# Patient Record
Sex: FEMALE | Race: BLACK OR AFRICAN AMERICAN | NOT HISPANIC OR LATINO | Employment: PART TIME | URBAN - NONMETROPOLITAN AREA
[De-identification: names, ages, dates, MRNs, and addresses within clinical notes are randomized per-mention and may not be internally consistent; named-entity substitution may affect disease eponyms.]

---

## 2017-03-07 RX ORDER — LISINOPRIL 5 MG/1
TABLET ORAL
Qty: 30 TABLET | Refills: 2 | Status: SHIPPED | OUTPATIENT
Start: 2017-03-07 | End: 2017-07-07 | Stop reason: SDUPTHER

## 2017-03-07 RX ORDER — ASPIRIN 81 MG
TABLET, DELAYED RELEASE (ENTERIC COATED) ORAL
Qty: 30 TABLET | Refills: 2 | Status: SHIPPED | OUTPATIENT
Start: 2017-03-07 | End: 2018-01-29

## 2017-04-04 ENCOUNTER — OFFICE VISIT (OUTPATIENT)
Dept: CARDIOLOGY | Facility: CLINIC | Age: 47
End: 2017-04-04

## 2017-04-04 VITALS
HEIGHT: 64 IN | BODY MASS INDEX: 31.07 KG/M2 | DIASTOLIC BLOOD PRESSURE: 70 MMHG | WEIGHT: 182 LBS | HEART RATE: 70 BPM | SYSTOLIC BLOOD PRESSURE: 120 MMHG

## 2017-04-04 DIAGNOSIS — I25.10 ATHEROSCLEROSIS OF NATIVE CORONARY ARTERY OF NATIVE HEART WITHOUT ANGINA PECTORIS: Primary | ICD-10-CM

## 2017-04-04 DIAGNOSIS — I10 ESSENTIAL HYPERTENSION: ICD-10-CM

## 2017-04-04 DIAGNOSIS — R06.00 DYSPNEA, UNSPECIFIED TYPE: ICD-10-CM

## 2017-04-04 DIAGNOSIS — I36.9 TRICUSPID VALVE DISORDERS, SPECIFIED AS NONRHEUMATIC: ICD-10-CM

## 2017-04-04 PROCEDURE — 99213 OFFICE O/P EST LOW 20 MIN: CPT | Performed by: INTERNAL MEDICINE

## 2017-04-04 RX ORDER — OMEPRAZOLE 40 MG/1
40 CAPSULE, DELAYED RELEASE ORAL
COMMUNITY
Start: 2017-03-07 | End: 2018-03-08

## 2017-04-04 NOTE — PROGRESS NOTES
Dominik Amaya  46 y.o. female    04/04/2017  1. Atherosclerosis of native coronary artery of native heart without angina pectoris    2. 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 Dyspnea, unspecified type    4. Essential hypertension        History of Present Illness: Routine follow-up    46 years old patient with history of hypertension, hyperlipidemia, CAD status post PTA stent of diagonal branch with a normal left and a systolic function and mild tricuspid regurgitation.  Patient presented routine follow-up.  She denies any orthopnea, PND, nauseous, vomiting, diarrhea.  She denies any intermittent claudication.  Present dysuria or hematuria.  No fever cough or chills reported.            SUBJECTIVE    Allergies   Allergen Reactions   • Acetaminophen    • Hydrocodone Bitartrate Er    • Oxycodone          Past Medical History:   Diagnosis Date   • Coronary atherosclerosis of native coronary artery    • Dyspnea    • Hyperlipidemia    • Hypertension    • Tricuspid valve disorders, specified as nonrheumatic          Past Surgical History:   Procedure Laterality Date   • ANKLE SURGERY     • CARDIAC CATHETERIZATION           No family history on file.      Social History     Social History   • Marital status:      Spouse name: N/A   • Number of children: N/A   • Years of education: N/A     Occupational History   • Not on file.     Social History Main Topics   • Smoking status: Former Smoker   • Smokeless tobacco: Not on file   • Alcohol use Not on file   • Drug use: Not on file   • Sexual activity: Not on file     Other Topics Concern   • Not on file     Social History Narrative         Current Outpatient Prescriptions   Medication Sig Dispense Refill   • omeprazole (priLOSEC) 40 MG capsule Take 40 mg by mouth.     • ASPIRIN LOW DOSE 81 MG EC tablet TAKE ONE TABLET BY MOUTH ONCE DAILY 30 tablet 2   • atorvastatin (LIPITOR) 20 MG tablet Take 1 tablet by mouth Daily.     • carvedilol  "(COREG) 3.125 MG tablet Take 1 tablet by mouth 2 (Two) Times a Day.     • clopidogrel (PLAVIX) 75 MG tablet TAKE ONE TABLET BY MOUTH EVERY DAY 90 tablet 2   • ferrous sulfate 324 (65 FE) MG tablet delayed-release EC tablet Take 324 mg by mouth Daily With Breakfast.     • lisinopril (PRINIVIL,ZESTRIL) 5 MG tablet TAKE ONE TABLET BY MOUTH ONCE DAILY 30 tablet 2     No current facility-administered medications for this visit.          OBJECTIVE    /70  Pulse 70  Ht 64\" (162.6 cm)  Wt 182 lb (82.6 kg)  BMI 31.24 kg/m2        Review of Systems     Constitutional:  Denies recent weight loss, weight gain, fever or chills, no change in exercise tolerance     HENT:  Denies any hearing loss, epistaxis, hoarseness, or difficulty speaking.     Eyes: No blurring  Respiratory:  Denies dyspnea with exertion,no cough, wheezing, or hemoptysis.     Cardiovascular: See H&P    Gastrointestinal:  Denies change in bowel habits, dyspepsia, ulcer disease, hematochezia, or melena.     Endocrine: Negative for cold intolerance, heat intolerance, polydipsia, polyphagia and polyuria. Denies any history of weight change, heat/cold intolerance, polydipsia, polyuria     Genitourinary: Negative.      Musculoskeletal: Denies any history of arthritic symptoms or back problems     Skin:  Denies any change in hair or nails, rashes, or skin lesions.     Allergic/Immunologic: Negative.  Negative for environmental allergies, food allergies and immunocompromised state.     Neurological:  Denies any history of recurrent headaches, strokes, TIA, or seizure disorder.     Hematological: Denies any food allergies, seasonal allergies, bleeding disorders, or lymphadenopathy.     Psychiatric/Behavioral: Denies any history of depression, substance abuse, or change in cognitive function.         Physical Exam     Constitutional: Cooperative, alert and oriented, well-developed, well-nourished, in no acute distress.     HENT:   Head: Normocephalic, normal " hair patterns, no masses or tenderness.  Ears, Nose, and Throat: No gross abnormalities. No pallor or cyanosis. Dentition good.   Eyes: EOMS intact, PERRL, conjunctivae and lids unremarkable. Fundoscopic exam and visual fields not performed.   Neck: No palpable masses or adenopathy, no thyromegaly, no JVD, carotid pulses are full and equal bilaterally and without  Bruits.     Cardiovascular: Regular rhythm, S1 and S2 normal, no S3 or S4. Apical impulse not displaced. No murmurs, gallops, or rubs detected.     Pulmonary/Chest: Chest: normal symmetry, no tenderness to palpation, normal respiratory excursion, no intercostal retraction, no use of accessory muscles.            Pulmonary: Normal breath sounds. No rales or ronchi.    Abdominal: Abdomen soft, bowel sounds normoactive, no masses, no hepatosplenomegaly, non-tender, no bruits.     Musculoskeletal: No deformities, clubbing, cyanosis, erythema, or edema observed. There are no spinal abnormalities noted. Normal muscle strength and tone. Pulses full and equal in all extremities, no bruits auscultated.     Neurological: No gross motor or sensory deficits noted, affect appropriate, oriented to time, person, place.     Skin: Warm and dry to the touch, no apparent skin lesions or masses noted.     Psychiatric: She has a normal mood and affect. Her behavior is normal. Judgment and thought content normal.         Procedures      Lab Results   Component Value Date    WBC 5.6 10/27/2014    HGB 9.2 (L) 10/27/2014    HCT 30.0 (L) 10/27/2014    MCV 75.6 (L) 10/27/2014     10/27/2014     Lab Results   Component Value Date    GLUCOSE 92 10/27/2014    BUN 12 10/27/2014    CREATININE 0.7 10/27/2014    CO2 24 10/27/2014    CALCIUM 9.6 10/27/2014    ALBUMIN 4.1 10/27/2014    AST 21 10/27/2014    ALT 19 10/27/2014     No results found for: CHOL  Lab Results   Component Value Date    TRIG 64 10/28/2014     Lab Results   Component Value Date    HDL 62 10/28/2014     Lab  Results   Component Value Date    LDLCALC 55 10/28/2014     No results found for: LDL  No results found for: HDLLDLRATIO  No components found for: CHOLHDL  No results found for: HGBA1C  Lab Results   Component Value Date    TSH 2.19 10/27/2014           ASSESSMENT AND PLAN  #1 CAD status post PTCA stent of diagonal branch #2 hypertension with hypertensive heart disease #3 hyperlipidemia    Clinically there is no sign of any acute cardiac decompensation based the clinical history physical finding.  Evidence of active ischemia.  Patient doing remarkably well from cardiac point of the patient will continue present medication which is lisinopril for hypertension with good blood pressure control.  Coreg 3.125 mg twice a day for similar reason.  And antiplatelets agent with history of PTCA stent.  Atorvastatin for hyperlipidemia.  We'll see her back in 6-8 month R depends on patient clinical conditions.    Diagnoses and all orders for this visit:    Atherosclerosis of native coronary artery of native heart without angina pectoris    Tricuspid valve disorders, specified as nonrheumatic (aka 424.2)    Dyspnea, unspecified type    Essential hypertension        Shan Baez MD  4/4/2017  2:28 PM

## 2017-04-07 RX ORDER — CARVEDILOL 3.12 MG/1
3.12 TABLET ORAL 2 TIMES DAILY
Qty: 60 TABLET | Refills: 6 | Status: SHIPPED | OUTPATIENT
Start: 2017-04-07 | End: 2017-07-07 | Stop reason: SDUPTHER

## 2017-07-10 RX ORDER — LISINOPRIL 5 MG/1
TABLET ORAL
Qty: 30 TABLET | Refills: 6 | Status: SHIPPED | OUTPATIENT
Start: 2017-07-10 | End: 2018-01-29 | Stop reason: SDUPTHER

## 2017-07-10 RX ORDER — CARVEDILOL 3.12 MG/1
TABLET ORAL
Qty: 60 TABLET | Refills: 6 | Status: SHIPPED | OUTPATIENT
Start: 2017-07-10 | End: 2018-01-29 | Stop reason: SDUPTHER

## 2017-08-11 RX ORDER — ATORVASTATIN CALCIUM 20 MG/1
TABLET, FILM COATED ORAL
Qty: 30 TABLET | Refills: 6 | Status: SHIPPED | OUTPATIENT
Start: 2017-08-11 | End: 2018-01-29 | Stop reason: SDUPTHER

## 2017-10-18 RX ORDER — CLOPIDOGREL BISULFATE 75 MG/1
TABLET ORAL
Qty: 90 TABLET | Refills: 3 | Status: SHIPPED | OUTPATIENT
Start: 2017-10-18 | End: 2018-01-29 | Stop reason: SDUPTHER

## 2018-01-29 ENCOUNTER — OFFICE VISIT (OUTPATIENT)
Dept: CARDIOLOGY | Facility: CLINIC | Age: 48
End: 2018-01-29

## 2018-01-29 VITALS
SYSTOLIC BLOOD PRESSURE: 120 MMHG | HEIGHT: 64 IN | WEIGHT: 185 LBS | BODY MASS INDEX: 31.58 KG/M2 | HEART RATE: 74 BPM | DIASTOLIC BLOOD PRESSURE: 70 MMHG

## 2018-01-29 DIAGNOSIS — I10 ESSENTIAL HYPERTENSION: ICD-10-CM

## 2018-01-29 DIAGNOSIS — I25.10 ATHEROSCLEROSIS OF NATIVE CORONARY ARTERY OF NATIVE HEART WITHOUT ANGINA PECTORIS: Primary | ICD-10-CM

## 2018-01-29 DIAGNOSIS — I82.402 DEEP VEIN THROMBOSIS (DVT) OF LEFT LOWER EXTREMITY, UNSPECIFIED CHRONICITY, UNSPECIFIED VEIN (HCC): Primary | ICD-10-CM

## 2018-01-29 DIAGNOSIS — I36.9 TRICUSPID VALVE DISORDERS, NON-RHEUMATIC: ICD-10-CM

## 2018-01-29 DIAGNOSIS — R06.00 DYSPNEA, UNSPECIFIED TYPE: ICD-10-CM

## 2018-01-29 PROCEDURE — 93000 ELECTROCARDIOGRAM COMPLETE: CPT | Performed by: INTERNAL MEDICINE

## 2018-01-29 PROCEDURE — 99214 OFFICE O/P EST MOD 30 MIN: CPT | Performed by: INTERNAL MEDICINE

## 2018-01-29 RX ORDER — ATORVASTATIN CALCIUM 20 MG/1
20 TABLET, FILM COATED ORAL DAILY
Qty: 30 TABLET | Refills: 6 | Status: SHIPPED | OUTPATIENT
Start: 2018-01-29 | End: 2019-09-27 | Stop reason: SDUPTHER

## 2018-01-29 RX ORDER — AMLODIPINE BESYLATE 5 MG/1
5 TABLET ORAL DAILY
Qty: 30 TABLET | Refills: 6 | Status: SHIPPED | OUTPATIENT
Start: 2018-01-29 | End: 2018-10-01 | Stop reason: SDUPTHER

## 2018-01-29 RX ORDER — CARVEDILOL 3.12 MG/1
3.12 TABLET ORAL 2 TIMES DAILY WITH MEALS
Qty: 60 TABLET | Refills: 6 | Status: SHIPPED | OUTPATIENT
Start: 2018-01-29 | End: 2018-10-01 | Stop reason: SDUPTHER

## 2018-01-29 RX ORDER — CLOPIDOGREL BISULFATE 75 MG/1
75 TABLET ORAL DAILY
Qty: 90 TABLET | Refills: 1 | Status: SHIPPED | OUTPATIENT
Start: 2018-01-29 | End: 2019-02-01 | Stop reason: SDUPTHER

## 2018-01-29 RX ORDER — LISINOPRIL 5 MG/1
5 TABLET ORAL DAILY
Qty: 30 TABLET | Refills: 6 | Status: SHIPPED | OUTPATIENT
Start: 2018-01-29 | End: 2018-01-29

## 2018-01-29 NOTE — PROGRESS NOTES
Dominik Amaya  47 y.o. female    01/29/2018  1. Atherosclerosis of native coronary artery of native heart without angina pectoris    2. 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 Dyspnea, unspecified type    4. Essential hypertension        History of Present Illness    : Routine follow-up     47 years old patient with history of hypertension, hyperlipidemia, CAD status post PTA stent of diagonal branch with a normal left and a systolic function and mild tricuspid regurgitation.  Patient presented routine follow-up.  She denies any orthopnea, PND, nauseous, vomiting, diarrhea.  She denies any intermittent claudication.  Present dysuria or hematuria.  No fever cough or chills reported.Patient had last echocardiographic study in 2016 reported normal left and a systolic function with mild-to-moderate mitral regurgitation.  Last lipid profile I believe in the 2014 are 2015 had normal LDL.Complain of some left lower extremity pain tenderness upon palpation.  We'll arrange a venous Doppler to rule out DVT.        SUBJECTIVE    Allergies   Allergen Reactions   • Acetaminophen    • Hydrocodone Bitartrate Er    • Oxycodone          Past Medical History:   Diagnosis Date   • Coronary atherosclerosis of native coronary artery    • Dyspnea    • Hyperlipidemia    • Hypertension    • Tricuspid valve disorders, specified as nonrheumatic          Past Surgical History:   Procedure Laterality Date   • ANKLE SURGERY     • CARDIAC CATHETERIZATION           No family history on file.      Social History     Social History   • Marital status:      Spouse name: N/A   • Number of children: N/A   • Years of education: N/A     Occupational History   • Not on file.     Social History Main Topics   • Smoking status: Former Smoker   • Smokeless tobacco: Not on file   • Alcohol use Not on file   • Drug use: Not on file   • Sexual activity: Not on file     Other Topics Concern   • Not on file     Social  History Narrative         Current Outpatient Prescriptions   Medication Sig Dispense Refill   • ASPIRIN LOW DOSE 81 MG EC tablet TAKE ONE TABLET BY MOUTH ONCE DAILY 30 tablet 2   • atorvastatin (LIPITOR) 20 MG tablet TAKE ONE TABLET BY MOUTH AT BEDTIME 30 tablet 6   • carvedilol (COREG) 3.125 MG tablet TAKE ONE TABLET BY MOUTH TWICE DAILY 60 tablet 6   • clopidogrel (PLAVIX) 75 MG tablet TAKE ONE TABLET BY MOUTH EVERY DAY 90 tablet 3   • ferrous sulfate 324 (65 FE) MG tablet delayed-release EC tablet Take 324 mg by mouth Daily With Breakfast.     • lisinopril (PRINIVIL,ZESTRIL) 5 MG tablet TAKE ONE TABLET BY MOUTH ONCE DAILY 30 tablet 6   • omeprazole (priLOSEC) 40 MG capsule Take 40 mg by mouth.       No current facility-administered medications for this visit.          OBJECTIVE    There were no vitals taken for this visit.        Review of Systems     Constitutional:  Denies recent weight loss, weight gain, fever or chills, no change in exercise tolerance     HENT:  Denies any hearing loss, epistaxis, hoarseness, or difficulty speaking.     Eyes: Wears eyeglasses or contact lenses     Respiratory:  Denies dyspnea with exertion,no cough, wheezing, or hemoptysis.     Cardiovascular: The 10th    Gastrointestinal:  Denies change in bowel habits, dyspepsia, ulcer disease, hematochezia, or melena.     Endocrine: Negative for cold intolerance, heat intolerance, polydipsia, polyphagia and polyuria. Denies any history of weight change, heat/cold intolerance, polydipsia, polyuria     Genitourinary: Negative.      Musculoskeletal: Denies any history of arthritic symptoms or back problems     Skin:  Denies any change in hair or nails, rashes, or skin lesions.     Allergic/Immunologic: Negative.  Negative for environmental allergies, food allergies and immunocompromised state.     Neurological:  Denies any history of recurrent headaches, strokes, TIA, or seizure disorder.     Hematological: Denies any food allergies, seasonal  allergies, bleeding disorders, or lymphadenopathy.     Psychiatric/Behavioral: Denies any history of depression, substance abuse, or change in cognitive function.         Physical Exam     Constitutional: Cooperative, alert and oriented, well-developed, well-nourished, in no acute distress.     HENT:   Head: Normocephalic, normal hair patterns, no masses or tenderness.  Ears, Nose, and Throat: No gross abnormalities. No pallor or cyanosis. Dentition good.   Eyes: EOMS intact, PERRL, conjunctivae and lids unremarkable. Fundoscopic exam and visual fields not performed.   Neck: No palpable masses or adenopathy, no thyromegaly, no JVD, carotid pulses are full and equal bilaterally and without  Bruits.     Cardiovascular: Regular rhythm, S1 and S2 normal, no S3 or S4. Apical impulse not displaced. No murmurs, gallops, or rubs detected.     Pulmonary/Chest: Chest: normal symmetry, no tenderness to palpation, normal respiratory excursion, no intercostal retraction, no use of accessory muscles.            Pulmonary: Normal breath sounds. No rales or ronchi.    Abdominal: Abdomen soft, bowel sounds normoactive, no masses, no hepatosplenomegaly, non-tender, no bruits.     Musculoskeletal: No deformities, clubbing, cyanosis, erythema, or edema observed. There are no spinal abnormalities noted. Normal muscle strength and tone. Pulses full and equal in all extremities, no bruits auscultated.     Neurological: No gross motor or sensory deficits noted, affect appropriate, oriented to time, person, place.     Skin: Warm and dry to the touch, no apparent skin lesions or masses noted.     Psychiatric: She has a normal mood and affect. Her behavior is normal. Judgment and thought content normal.           ECG 12 Lead  Date/Time: 1/29/2018 9:26 AM  Performed by: GEORGINA ALVARADO  Authorized by: GEORGINA ALVARADO   Comparison: not compared with previous ECG   Rhythm: sinus rhythm              Lab Results   Component Value Date    WBC 5.6  10/27/2014    HGB 9.2 (L) 10/27/2014    HCT 30.0 (L) 10/27/2014    MCV 75.6 (L) 10/27/2014     10/27/2014     Lab Results   Component Value Date    GLUCOSE 92 10/27/2014    BUN 12 10/27/2014    CREATININE 0.7 10/27/2014    CO2 24 10/27/2014    CALCIUM 9.6 10/27/2014    ALBUMIN 4.1 10/27/2014    AST 21 10/27/2014    ALT 19 10/27/2014     No results found for: CHOL  Lab Results   Component Value Date    TRIG 64 10/28/2014     Lab Results   Component Value Date    HDL 62 10/28/2014     Lab Results   Component Value Date    LDLCALC 55 10/28/2014     No results found for: LDL  No results found for: HDLLDLRATIO  No components found for: CHOLHDL  No results found for: HGBA1C  Lab Results   Component Value Date    TSH 2.19 10/27/2014           ASSESSMENT AND PLAN    #1 CAD status post PTCA stent of diagonal branch #2 hypertension with hypertensive heart disease #3 hyperlipidemia     Clinically there is no sign of any acute cardiac decompensation based the clinical history physical finding. No  Evidence of active ischemia.  Patient doing remarkably well from cardiac point of the patient Started on Norvasc as she is complaining of significant dry cough probably due to ACE inhibitor.  for hypertension with good blood pressure control.  Coreg 3.125 mg twice a day for similar reason.  And antiplatelets agent with history of PTCA stent.  Atorvastatin for hyperlipidemia.  And lipid profile.  Also arrange a venous Doppler to rule out DVT as described above.  Risk factor lifestyle modification discussed.   We'll see her back in 6-8 month R depends on patient clinical conditions  Diagnoses and all orders for this visit:    Atherosclerosis of native coronary artery of native heart without angina pectoris    Tricuspid valve disorders, specified as nonrheumatic (aka 424.2)    Dyspnea, unspecified type    Essential hypertension        Shan Baez MD  1/29/2018  9:00 AM

## 2018-03-02 ENCOUNTER — OFFICE VISIT (OUTPATIENT)
Dept: CARDIOLOGY | Facility: CLINIC | Age: 48
End: 2018-03-02

## 2018-03-02 VITALS
DIASTOLIC BLOOD PRESSURE: 84 MMHG | SYSTOLIC BLOOD PRESSURE: 142 MMHG | WEIGHT: 183 LBS | HEART RATE: 69 BPM | HEIGHT: 64 IN | BODY MASS INDEX: 31.24 KG/M2

## 2018-03-02 DIAGNOSIS — I10 ESSENTIAL HYPERTENSION: ICD-10-CM

## 2018-03-02 DIAGNOSIS — I36.9 TRICUSPID VALVE DISORDERS, NON-RHEUMATIC: ICD-10-CM

## 2018-03-02 DIAGNOSIS — I25.10 ATHEROSCLEROSIS OF NATIVE CORONARY ARTERY OF NATIVE HEART WITHOUT ANGINA PECTORIS: Primary | ICD-10-CM

## 2018-03-02 DIAGNOSIS — R06.00 DYSPNEA, UNSPECIFIED TYPE: ICD-10-CM

## 2018-03-02 PROCEDURE — 99213 OFFICE O/P EST LOW 20 MIN: CPT | Performed by: INTERNAL MEDICINE

## 2018-03-02 NOTE — PROGRESS NOTES
Dominik Amaya  47 y.o. female    03/02/2018  1. Atherosclerosis of native coronary artery of native heart without angina pectoris    2. 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 Dyspnea, unspecified type    4. Essential hypertension        History of Present Illness:    47 years old patient with history of hypertension, hyperlipidemia, CAD status post PTA stent of diagonal branch with  normal  systolic function and mild tricuspid regurgitation and mild to moderate mitral regurgitation on previous echo done in 2016.  Patient presented routine follow-up.  She denies any orthopnea, PND, nauseous, vomiting, diarrhea.  She denies any intermittent claudication.  Present dysuria or hematuria.  No fever cough or chills reported.  Patient complain mild dizziness upon postural change.  I will also patient increase of water intake.  No other symptoms reported by the patient's.  Patient recent normal Doppler study described below.    CARLOS Miller  1/2018  · Normal left lower extremity venous duplex scan.  · Negative for venous reflux.  · Negative for DVT.  ·   10 /2014  Total Cholesterol 0 - 199 mg/dl 130   Comments: CHOL DESIRED: < 200 MG/DL   Triglycerides 20 - 199 mg/dl 64   Comments: TRIG DESIRED: < 200 MG/DL   HDL Cholesterol 60 - 200 mg/dl 62   Comments: HDL DESIRED: > 60 MG/DL   LDL Cholesterol  0 - 129 mg/dl 55       SUBJECTIVE    Allergies   Allergen Reactions   • Acetaminophen    • Hydrocodone Bitartrate Er    • Oxycodone          Past Medical History:   Diagnosis Date   • Coronary atherosclerosis of native coronary artery    • Dyspnea    • Hyperlipidemia    • Hypertension    • Tricuspid valve disorders, specified as nonrheumatic          Past Surgical History:   Procedure Laterality Date   • ANKLE SURGERY     • CARDIAC CATHETERIZATION           Family History   Problem Relation Age of Onset   • No Known Problems Mother    • No Known Problems Father          Social History     Social  "History   • Marital status:      Spouse name: N/A   • Number of children: N/A   • Years of education: N/A     Occupational History   • Not on file.     Social History Main Topics   • Smoking status: Former Smoker   • Smokeless tobacco: Never Used   • Alcohol use No   • Drug use: No   • Sexual activity: Defer     Other Topics Concern   • Not on file     Social History Narrative         Current Outpatient Prescriptions   Medication Sig Dispense Refill   • amLODIPine (NORVASC) 5 MG tablet Take 1 tablet by mouth Daily. 30 tablet 6   • atorvastatin (LIPITOR) 20 MG tablet Take 1 tablet by mouth Daily. 30 tablet 6   • carvedilol (COREG) 3.125 MG tablet Take 1 tablet by mouth 2 (Two) Times a Day With Meals. 60 tablet 6   • clopidogrel (PLAVIX) 75 MG tablet Take 1 tablet by mouth Daily. 90 tablet 1   • ferrous sulfate 324 (65 FE) MG tablet delayed-release EC tablet Take 324 mg by mouth Daily With Breakfast.     • omeprazole (priLOSEC) 40 MG capsule Take 40 mg by mouth.       No current facility-administered medications for this visit.          OBJECTIVE    /84  Pulse 69  Ht 162.6 cm (64\")  Wt 83 kg (183 lb)  BMI 31.41 kg/m2        Review of Systems     Constitutional:  Denies recent weight loss, weight gain, fever or chills, no change in exercise tolerance     HENT:  Denies any hearing loss, epistaxis, hoarseness, or difficulty speaking.     Eyes:No blurry    Respiratory:  Denies dyspnea with exertion,no cough, wheezing, or hemoptysis.     Cardiovascular: See H&P    Gastrointestinal:  Denies change in bowel habits, dyspepsia, ulcer disease, hematochezia, or melena.     Endocrine: Negative for cold intolerance, heat intolerance, polydipsia, polyphagia and polyuria. Denies any history of weight change, heat/cold intolerance, polydipsia, polyuria     Genitourinary: Negative.      Musculoskeletal: Denies any history of arthritic symptoms or back problems     Skin:  Denies any change in hair or nails, rashes, or " skin lesions.     Allergic/Immunologic: Negative.  Negative for environmental allergies, food allergies and immunocompromised state.     Neurological:  Denies any history of recurrent headaches, strokes, TIA, or seizure disorder.     Hematological: Denies any food allergies, seasonal allergies, bleeding disorders, or lymphadenopathy.     Psychiatric/Behavioral: Denies any history of depression, substance abuse, or change in cognitive function.         Physical Exam     Constitutional: Cooperative, alert and oriented, well-developed, well-nourished, in no acute distress.     HENT:   Head: Normocephalic, normal hair patterns, no masses or tenderness.  Ears, Nose, and Throat: No gross abnormalities. No pallor or cyanosis. Dentition good.   Eyes: EOMS intact, PERRL, conjunctivae and lids unremarkable. Fundoscopic exam and visual fields not performed.   Neck: No palpable masses or adenopathy, no thyromegaly, no JVD, carotid pulses are full and equal bilaterally and without  Bruits.     Cardiovascular: Regular rhythm, S1 and S2 normal, no S3 or S4. Apical impulse not displaced. No murmurs, gallops, or rubs detected.     Pulmonary/Chest: Chest: normal symmetry, no tenderness to palpation, normal respiratory excursion, no intercostal retraction, no use of accessory muscles.            Pulmonary: Normal breath sounds. No rales or ronchi.    Abdominal: Abdomen soft, bowel sounds normoactive, no masses, no hepatosplenomegaly, non-tender, no bruits.     Musculoskeletal: No deformities, clubbing, cyanosis, erythema, or edema observed. There are no spinal abnormalities noted. Normal muscle strength and tone. Pulses full and equal in all extremities, no bruits auscultated.     Neurological: No gross motor or sensory deficits noted, affect appropriate, oriented to time, person, place.     Skin: Warm and dry to the touch, no apparent skin lesions or masses noted.     Psychiatric: She has a normal mood and affect. Her behavior is  normal. Judgment and thought content normal.         Procedures      Lab Results   Component Value Date    WBC 5.6 10/27/2014    HGB 9.2 (L) 10/27/2014    HCT 30.0 (L) 10/27/2014    MCV 75.6 (L) 10/27/2014     10/27/2014     Lab Results   Component Value Date    GLUCOSE 92 10/27/2014    BUN 12 10/27/2014    CREATININE 0.7 10/27/2014    CO2 24 10/27/2014    CALCIUM 9.6 10/27/2014    ALBUMIN 4.1 10/27/2014    AST 21 10/27/2014    ALT 19 10/27/2014     No results found for: CHOL  Lab Results   Component Value Date    TRIG 64 10/28/2014     Lab Results   Component Value Date    HDL 62 10/28/2014     No components found for: LDLCALC  Lab Results   Component Value Date    LDL 55 10/28/2014     No results found for: HDLLDLRATIO  No components found for: CHOLHDL  No results found for: HGBA1C  Lab Results   Component Value Date    TSH 2.19 10/27/2014           ASSESSMENT AND PLAN  #1 CAD status post PTCA stent of diagonal branch #2 hypertension with hypertensive heart disease #3 hyperlipidemia      Clinically there is no sign of any acute cardiac decompensation based the clinical history physical finding. No  Evidence of active ischemia.  Patient doing remarkably well from cardiac point of the patient Started on Norvasc as she is complaining of significant dry cough probably due to ACE inhibitor for hypertension with good blood pressure control.  Coreg 3.125 mg twice a day for similar reason.  antiplatelets agent with history of PTCA stent.  Complain mild dizziness upon changing posture for which I advised the patient to increase water intake  Atorvastatin for hyperlipidemia, risk factor/ lifestyle modification discussed.Dash diet explained given the BMI of 31 and hypertension.  Exercise Prescription given to the patient and we'll arrange an echocardiographic study in 6-8 month    Diagnoses and all orders for this visit:    Atherosclerosis of native coronary artery of native heart without angina pectoris    Tricuspid  valve disorders, specified as nonrheumatic (aka 424.2)    Dyspnea, unspecified type    Essential hypertension        Shan Baez MD  3/2/2018  8:50 AM

## 2018-10-01 RX ORDER — AMLODIPINE BESYLATE 5 MG/1
5 TABLET ORAL DAILY
Qty: 30 TABLET | Refills: 3 | Status: SHIPPED | OUTPATIENT
Start: 2018-10-01 | End: 2019-02-01 | Stop reason: SDUPTHER

## 2018-10-01 RX ORDER — CARVEDILOL 3.12 MG/1
TABLET ORAL
Qty: 60 TABLET | Refills: 3 | Status: SHIPPED | OUTPATIENT
Start: 2018-10-01 | End: 2019-02-01 | Stop reason: SDUPTHER

## 2018-11-12 ENCOUNTER — DOCUMENTATION (OUTPATIENT)
Dept: CARDIOLOGY | Facility: CLINIC | Age: 48
End: 2018-11-12

## 2019-01-08 ENCOUNTER — OFFICE VISIT (OUTPATIENT)
Dept: CARDIOLOGY | Facility: CLINIC | Age: 49
End: 2019-01-08

## 2019-01-08 VITALS
HEART RATE: 66 BPM | HEIGHT: 64 IN | OXYGEN SATURATION: 99 % | BODY MASS INDEX: 32.78 KG/M2 | WEIGHT: 192 LBS | SYSTOLIC BLOOD PRESSURE: 146 MMHG | DIASTOLIC BLOOD PRESSURE: 88 MMHG

## 2019-01-08 DIAGNOSIS — I36.9 TRICUSPID VALVE DISORDERS, NON-RHEUMATIC: ICD-10-CM

## 2019-01-08 DIAGNOSIS — I10 ESSENTIAL HYPERTENSION: ICD-10-CM

## 2019-01-08 DIAGNOSIS — I25.10 ATHEROSCLEROSIS OF NATIVE CORONARY ARTERY OF NATIVE HEART WITHOUT ANGINA PECTORIS: Primary | ICD-10-CM

## 2019-01-08 PROCEDURE — 99214 OFFICE O/P EST MOD 30 MIN: CPT | Performed by: INTERNAL MEDICINE

## 2019-01-08 NOTE — PROGRESS NOTES
Dominik Amaya  48 y.o. female    01/08/2019  1. Atherosclerosis of native coronary artery of native heart without angina pectoris    2. 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 Essential hypertension        History of Present Illness:    Patient's Body mass index is 32.96 kg/m². BMI is above normal parameters. Recommendations include: exercise counseling, nutrition counseling and referral to a nutritionist.    48 years old patient with history of hypertension, hyperlipidemia, CAD status post PTA stent of diagonal branch with  normal  systolic function and mild tricuspid regurgitation and mild to moderate mitral regurgitation on previous echo done in 2016.  Patient presented routine follow-up.  She denies any orthopnea, PND, nauseous, vomiting, diarrhea.  She denies any intermittent claudication.  Present dysuria or hematuria.  No fever cough or chills reported.  Patient complain mild dizziness upon postural change.  I will also patient increase of water intake.  No other symptoms reported by the patient's.  Patient recent normal Doppler study described below.     CARLOS Lopezplar  1/2018  · Normal left lower extremity venous duplex scan.  · Negative for venous reflux.  · Negative for DVT.  ·    3/2018  CHOLESTEROL 100 - 200 mg/dL 212 Abnormally high     TRIGLYCERIDES 30 - 200 mg/dL 168    HDL CHOLESTEROL 39 - 96 mg/dL 68    LDL CHOLESTEROL 5 - 99 mg/dL 123 Abnormally high     CHOLESTEROL/HDL RATIO 3.5 - 5.3  3.1 Abnormally low       HEMOGLOBIN A1C 4.5 - 6.2 % 5.8            10 /2014  Total Cholesterol 0 - 199 mg/dl 130   Comments: CHOL DESIRED: < 200 MG/DL   Triglycerides 20 - 199 mg/dl 64   Comments: TRIG DESIRED: < 200 MG/DL   HDL Cholesterol 60 - 200 mg/dl 62   Comments: HDL DESIRED: > 60 MG/DL             SUBJECTIVE:    Allergies   Allergen Reactions   • Acetaminophen    • Hydrocodone Bitartrate Er    • Oxycodone          Past Medical History:   Diagnosis Date   • Coronary  atherosclerosis of native coronary artery    • Dyspnea    • Hyperlipidemia    • Hypertension    • Tricuspid valve disorders, specified as nonrheumatic          Past Surgical History:   Procedure Laterality Date   • ANKLE SURGERY     • CARDIAC CATHETERIZATION           Family History   Problem Relation Age of Onset   • No Known Problems Mother    • No Known Problems Father          Social History     Socioeconomic History   • Marital status:      Spouse name: Not on file   • Number of children: Not on file   • Years of education: Not on file   • Highest education level: Not on file   Social Needs   • Financial resource strain: Not on file   • Food insecurity - worry: Not on file   • Food insecurity - inability: Not on file   • Transportation needs - medical: Not on file   • Transportation needs - non-medical: Not on file   Occupational History   • Not on file   Tobacco Use   • Smoking status: Former Smoker   • Smokeless tobacco: Never Used   Substance and Sexual Activity   • Alcohol use: Yes     Comment: socially   • Drug use: No   • Sexual activity: Defer   Other Topics Concern   • Not on file   Social History Narrative   • Not on file         Current Outpatient Medications   Medication Sig Dispense Refill   • amLODIPine (NORVASC) 5 MG tablet TAKE 1 TABLET BY MOUTH DAILY. 30 tablet 3   • atorvastatin (LIPITOR) 20 MG tablet Take 1 tablet by mouth Daily. 30 tablet 6   • carvedilol (COREG) 3.125 MG tablet TAKE 1 TABLET BY MOUTH TWO TIMES A DAY WITH MEALS. 60 tablet 3   • clopidogrel (PLAVIX) 75 MG tablet Take 1 tablet by mouth Daily. 90 tablet 1   • ferrous sulfate 324 (65 FE) MG tablet delayed-release EC tablet Take 324 mg by mouth Daily With Breakfast.       No current facility-administered medications for this visit.            Review of Systems:     Constitutional:  Denies recent weight loss, weight gain, fever or chills, no change in exercise tolerance.     HENT:  Denies any hearing loss, epistaxis,  "hoarseness, or difficulty speaking.     Eyes: Wears eyeglasses or contact lenses.    Respiratory:  Denies dyspnea with exertion,no cough, wheezing, or hemoptysis.     Cardiovascular: Negative for palpations, chest pain, orthopnea, PND, peripheral edema, syncope, or claudication.     Gastrointestinal:  Denies change in bowel habits, dyspepsia, ulcer disease, hematochezia, or melena.     Endocrine: Negative for cold intolerance, heat intolerance, polydipsia, polyphagia and polyuria. Denies any history of weight change, polydipsia, polyuria.     Genitourinary: Negative.      Musculoskeletal: Denies any history of arthritic symptoms or back problems.     Skin:  Denies any change in hair or nails, rashes, or skin lesions.     Allergic/Immunologic: Negative.  Negative for environmental allergies, food allergies and immunocompromised state.     Neurological:  Denies any history of recurrent headaches, strokes, TIA, or seizure disorder.     Hematological: Denies any food allergies, seasonal allergies, bleeding disorders, or lymphadenopathy.     Psychiatric/Behavioral: Denies any history of depression, substance abuse, or change in cognitive function.       OBJECTIVE:    /88   Pulse 66   Ht 162.6 cm (64\")   Wt 87.1 kg (192 lb)   SpO2 99%   BMI 32.96 kg/m²       Physical Exam:     Constitutional: Cooperative, alert and oriented, well-developed, well-nourished, in no acute distress.     HENT:   Head: Normocephalic, normal hair patterns, no masses or tenderness.  Ears, Nose, and Throat: No gross abnormalities. No pallor or cyanosis. Dentition good.   Eyes: EOMS intact, PERRL, conjunctivae and lids unremarkable. Fundoscopic exam and visual fields not performed.   Neck: No palpable masses or adenopathy, no thyromegaly, no JVD, carotid pulses are full and equal bilaterally and without  Bruits.     Cardiovascular: Regular rhythm, S1 and S2 normal, no S3 or S4. Apical impulse not displaced. No murmurs, gallops, or rubs " detected.     Pulmonary/Chest: Chest: normal symmetry, no tenderness to palpation, normal respiratory excursion, no intercostal retraction, no use of accessory muscles. Pulmonary: Normal breath sounds. No rales or rhonchi.    Abdominal: Abdomen soft, bowel sounds normoactive, no masses, no hepatosplenomegaly, non-tender, no bruits.     Musculoskeletal: No deformities, clubbing, cyanosis, erythema, or edema observed. There are no spinal abnormalities noted. Normal muscle strength and tone. Pulses full and equal in all extremities, no bruits auscultated.     Neurological: No gross motor or sensory deficits noted, affect appropriate, oriented to time, person, place.     Skin: Warm and dry to the touch, no apparent skin lesions or masses noted.     Psychiatric: She has a normal mood and affect. Her behavior is normal. Judgment and thought content normal.         Procedures      Lab Results   Component Value Date    WBC 5.6 10/27/2014    HGB 9.2 (L) 10/27/2014    HCT 30.0 (L) 10/27/2014    MCV 75.6 (L) 10/27/2014     10/27/2014     Lab Results   Component Value Date    GLUCOSE 92 10/27/2014    BUN 12 10/27/2014    CREATININE 0.7 10/27/2014    CO2 24 10/27/2014    CALCIUM 9.6 10/27/2014    ALBUMIN 4.1 10/27/2014    AST 21 10/27/2014    ALT 19 10/27/2014     No results found for: CHOL  Lab Results   Component Value Date    TRIG 64 10/28/2014     Lab Results   Component Value Date    HDL 62 10/28/2014     No components found for: LDLCALC  Lab Results   Component Value Date    LDL 55 10/28/2014     No results found for: HDLLDLRATIO  No components found for: CHOLHDL  No results found for: HGBA1C  Lab Results   Component Value Date    TSH 2.19 10/27/2014           ASSESSMENT AND PLAN:  #1 CAD status post PTCA stent of diagonal branch #2 hypertension with hypertensive heart disease #3 hyperlipidemia      Clinically there is no sign of any acute cardiac decompensation based the clinical history physical finding. No   Evidence of active ischemia.  Patient doing remarkably well from cardiac point of the patient Started on Norvasc as she is complaining of significant dry cough probably due to ACE inhibitor for hypertension with good blood pressure control.  Coreg 3.125 mg twice a day for similar reason.  antiplatelets agent with history of PTCA stent.  Complain mild dizziness upon changing posture for which I advised the patient to increase water intake  Atorvastatin for hyperlipidemia, risk factor/ lifestyle modification discussed.  Exercise Prescription given to the patient and significant of low carbohydrate, low-fat and dash diet explained        Dominik was seen today for follow-up.    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

## 2019-01-11 ENCOUNTER — TRANSCRIBE ORDERS (OUTPATIENT)
Dept: ADMINISTRATIVE | Facility: HOSPITAL | Age: 49
End: 2019-01-11

## 2019-01-11 DIAGNOSIS — Z02.1 PHYSICAL EXAM, PRE-EMPLOYMENT: Primary | ICD-10-CM

## 2019-02-01 RX ORDER — CLOPIDOGREL BISULFATE 75 MG/1
75 TABLET ORAL DAILY
Qty: 90 TABLET | Refills: 2 | Status: SHIPPED | OUTPATIENT
Start: 2019-02-01 | End: 2019-09-27 | Stop reason: SDUPTHER

## 2019-02-01 RX ORDER — AMLODIPINE BESYLATE 5 MG/1
5 TABLET ORAL DAILY
Qty: 30 TABLET | Refills: 4 | Status: SHIPPED | OUTPATIENT
Start: 2019-02-01 | End: 2019-09-27 | Stop reason: SDUPTHER

## 2019-02-01 RX ORDER — CARVEDILOL 3.12 MG/1
TABLET ORAL
Qty: 60 TABLET | Refills: 3 | Status: SHIPPED | OUTPATIENT
Start: 2019-02-01 | End: 2019-09-27 | Stop reason: SDUPTHER

## 2019-03-28 ENCOUNTER — TRANSCRIBE ORDERS (OUTPATIENT)
Dept: PODIATRY | Facility: CLINIC | Age: 49
End: 2019-03-28

## 2019-03-28 DIAGNOSIS — M79.671 FOOT PAIN, BILATERAL: Primary | ICD-10-CM

## 2019-03-28 DIAGNOSIS — M79.672 FOOT PAIN, BILATERAL: Primary | ICD-10-CM

## 2019-05-01 ENCOUNTER — OFFICE VISIT (OUTPATIENT)
Dept: PODIATRY | Facility: CLINIC | Age: 49
End: 2019-05-01

## 2019-05-01 VITALS — HEART RATE: 77 BPM | BODY MASS INDEX: 30.9 KG/M2 | OXYGEN SATURATION: 100 % | HEIGHT: 64 IN | WEIGHT: 181 LBS

## 2019-05-01 DIAGNOSIS — M79.671 RIGHT FOOT PAIN: Primary | ICD-10-CM

## 2019-05-01 DIAGNOSIS — M76.61 ACHILLES TENDINITIS OF RIGHT LOWER EXTREMITY: ICD-10-CM

## 2019-05-01 PROCEDURE — 99203 OFFICE O/P NEW LOW 30 MIN: CPT | Performed by: PODIATRIST

## 2019-05-01 RX ORDER — FLUTICASONE PROPIONATE 50 MCG
2 SPRAY, SUSPENSION (ML) NASAL
COMMUNITY
Start: 2019-02-26 | End: 2020-02-27

## 2019-05-01 RX ORDER — HYDROCHLOROTHIAZIDE 25 MG/1
25 TABLET ORAL
COMMUNITY
Start: 2019-03-28 | End: 2019-09-27 | Stop reason: SDUPTHER

## 2019-05-01 RX ORDER — MELOXICAM 15 MG/1
15 TABLET ORAL DAILY
Qty: 30 TABLET | Refills: 1 | Status: SHIPPED | OUTPATIENT
Start: 2019-05-01 | End: 2019-06-12 | Stop reason: SDUPTHER
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
## 2019-06-12 RX ORDER — MELOXICAM 15 MG/1
15 TABLET ORAL DAILY
Qty: 30 TABLET | Refills: 1 | Status: SHIPPED | OUTPATIENT
Start: 2019-06-12

## 2019-09-27 RX ORDER — HYDROCHLOROTHIAZIDE 25 MG/1
25 TABLET ORAL DAILY
Qty: 90 TABLET | Refills: 2 | Status: SHIPPED | OUTPATIENT
Start: 2019-09-27 | End: 2020-09-27

## 2019-09-27 RX ORDER — CLOPIDOGREL BISULFATE 75 MG/1
75 TABLET ORAL DAILY
Qty: 90 TABLET | Refills: 2 | Status: SHIPPED | OUTPATIENT
Start: 2019-09-27 | End: 2022-05-27 | Stop reason: SDUPTHER

## 2019-09-27 RX ORDER — ATORVASTATIN CALCIUM 20 MG/1
20 TABLET, FILM COATED ORAL DAILY
Qty: 90 TABLET | Refills: 2 | Status: SHIPPED | OUTPATIENT
Start: 2019-09-27 | End: 2022-05-27 | Stop reason: SDUPTHER

## 2019-09-27 RX ORDER — CARVEDILOL 3.12 MG/1
3.12 TABLET ORAL 2 TIMES DAILY WITH MEALS
Qty: 180 TABLET | Refills: 2 | Status: SHIPPED | OUTPATIENT
Start: 2019-09-27 | End: 2019-11-22 | Stop reason: DRUGHIGH

## 2019-09-27 RX ORDER — AMLODIPINE BESYLATE 5 MG/1
5 TABLET ORAL DAILY
Qty: 90 TABLET | Refills: 2 | Status: SHIPPED | OUTPATIENT
Start: 2019-09-27 | End: 2022-05-27

## 2019-11-22 ENCOUNTER — OFFICE VISIT (OUTPATIENT)
Dept: CARDIOLOGY | Facility: CLINIC | Age: 49
End: 2019-11-22

## 2019-11-22 ENCOUNTER — APPOINTMENT (OUTPATIENT)
Dept: LAB | Facility: HOSPITAL | Age: 49
End: 2019-11-22

## 2019-11-22 VITALS
OXYGEN SATURATION: 99 % | HEIGHT: 64 IN | SYSTOLIC BLOOD PRESSURE: 146 MMHG | WEIGHT: 178 LBS | HEART RATE: 77 BPM | BODY MASS INDEX: 30.39 KG/M2 | DIASTOLIC BLOOD PRESSURE: 78 MMHG

## 2019-11-22 DIAGNOSIS — I36.9 TRICUSPID VALVE DISORDERS, NON-RHEUMATIC: ICD-10-CM

## 2019-11-22 DIAGNOSIS — R06.00 DYSPNEA, UNSPECIFIED TYPE: ICD-10-CM

## 2019-11-22 DIAGNOSIS — I25.10 ATHEROSCLEROSIS OF NATIVE CORONARY ARTERY OF NATIVE HEART WITHOUT ANGINA PECTORIS: Primary | ICD-10-CM

## 2019-11-22 DIAGNOSIS — I10 ESSENTIAL HYPERTENSION: ICD-10-CM

## 2019-11-22 LAB
ALBUMIN SERPL-MCNC: 4.5 G/DL (ref 3.5–5.2)
ALP SERPL-CCNC: 79 U/L (ref 39–117)
ALT SERPL W P-5'-P-CCNC: 10 U/L (ref 1–33)
AST SERPL-CCNC: 12 U/L (ref 1–32)
BILIRUB CONJ SERPL-MCNC: 0.2 MG/DL (ref 0.2–0.3)
BILIRUB INDIRECT SERPL-MCNC: 0.5 MG/DL
BILIRUB SERPL-MCNC: 0.7 MG/DL (ref 0.2–1.2)
CHOLEST SERPL-MCNC: 155 MG/DL (ref 0–200)
HBA1C MFR BLD: 5.7 % (ref 4.8–5.6)
HDLC SERPL-MCNC: 67 MG/DL (ref 40–60)
LDLC SERPL CALC-MCNC: 72 MG/DL (ref 0–100)
LDLC/HDLC SERPL: 1.08 {RATIO}
PROT SERPL-MCNC: 7.9 G/DL (ref 6–8.5)
TRIGL SERPL-MCNC: 78 MG/DL (ref 0–150)
VLDLC SERPL-MCNC: 15.6 MG/DL (ref 5–40)

## 2019-11-22 PROCEDURE — 80061 LIPID PANEL: CPT | Performed by: INTERNAL MEDICINE

## 2019-11-22 PROCEDURE — 83036 HEMOGLOBIN GLYCOSYLATED A1C: CPT | Performed by: INTERNAL MEDICINE

## 2019-11-22 PROCEDURE — 80076 HEPATIC FUNCTION PANEL: CPT | Performed by: INTERNAL MEDICINE

## 2019-11-22 PROCEDURE — 36415 COLL VENOUS BLD VENIPUNCTURE: CPT | Performed by: INTERNAL MEDICINE

## 2019-11-22 PROCEDURE — 99213 OFFICE O/P EST LOW 20 MIN: CPT | Performed by: INTERNAL MEDICINE

## 2019-11-22 RX ORDER — CARVEDILOL 6.25 MG/1
6.25 TABLET ORAL 2 TIMES DAILY
Qty: 180 TABLET | Refills: 3 | Status: SHIPPED | OUTPATIENT
Start: 2019-11-22 | End: 2022-05-27 | Stop reason: SDUPTHER

## 2019-11-22 NOTE — PROGRESS NOTES
Dominik Amaya  49 y.o. female    11/22/2019  1. Atherosclerosis of native coronary artery of native heart without angina pectoris    2. 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 Dyspnea, unspecified type    4. Essential hypertension    #5    hyperlipidemia    History of Present Illness:      Body mass index is 30.55 kg/m². BMI is above normal parameters. Recommendations include: exercise counseling, nutrition counseling and referral to primary care.    49 years old patient with history of hypertension, hyperlipidemia, CAD status post PTA stent of diagonal branch with  normal  systolic function and mild tricuspid regurgitation and mild to moderate mitral regurgitation on previous echo done in 2016.  Patient presented routine follow-up.  She denies any orthopnea, PND, nauseous, vomiting, diarrhea.  She denies any intermittent claudication.  Present dysuria or hematuria.  No fever cough or chills reported.  Patient complain mild dizziness upon postural change.  I will also patient increase of water intake.  No other symptoms reported by the patient's.    Stress test 2/2019  Patient exercised according to Fan protocol for 10 minutes and 11 seconds with METs achieved 13.4.  This represents  good functional capacity.  The heart rate increased from 80  to 176 bpm reflects  100% of age meditated heart rate.  Patient had hypertensive blood pressure response.  Test was stopped due to target heart rate achieved and fatigability.  No ST-T wave changes suggesting ischemia.  No arrhythmia noted.    V. Dopplar  1/2018  · Normal left lower extremity venous duplex scan.  · Negative for venous reflux.  · Negative for DVT.  ·    3/2018  CHOLESTEROL 100 - 200 mg/dL 212 Abnormally high     TRIGLYCERIDES 30 - 200 mg/dL 168    HDL CHOLESTEROL 39 - 96 mg/dL 68    LDL CHOLESTEROL 5 - 99 mg/dL 123 Abnormally high     CHOLESTEROL/HDL RATIO 3.5 - 5.3  3.1 Abnormally low        HEMOGLOBIN A1C 4.5 - 6.2 % 5.8                 10 /2014  Total Cholesterol 0 - 199 mg/dl 130   Comments: CHOL DESIRED: < 200 MG/DL   Triglycerides 20 - 199 mg/dl 64   Comments: TRIG DESIRED: < 200 MG/DL   HDL Cholesterol 60 - 200 mg/dl 62             SUBJECTIVE:    Allergies   Allergen Reactions   • Acetaminophen    • Hydrocodone Bitartrate    • Oxycodone          Past Medical History:   Diagnosis Date   • Coronary atherosclerosis of native coronary artery    • Dyspnea    • Hyperlipidemia    • Hypertension    • Tricuspid valve disorders, specified as nonrheumatic          Past Surgical History:   Procedure Laterality Date   • ANKLE SURGERY     • CARDIAC CATHETERIZATION           Family History   Problem Relation Age of Onset   • No Known Problems Mother    • No Known Problems Father          Social History     Socioeconomic History   • Marital status:      Spouse name: Not on file   • Number of children: Not on file   • Years of education: Not on file   • Highest education level: Not on file   Tobacco Use   • Smoking status: Former Smoker   • Smokeless tobacco: Never Used   Substance and Sexual Activity   • Alcohol use: Yes     Comment: socially   • Drug use: No   • Sexual activity: Defer         Current Outpatient Medications   Medication Sig Dispense Refill   • amLODIPine (NORVASC) 5 MG tablet Take 1 tablet by mouth Daily. 90 tablet 2   • atorvastatin (LIPITOR) 20 MG tablet Take 1 tablet by mouth Daily. 90 tablet 2   • calcium-vitamin D (OSCAL) 250-125 MG-UNIT per tablet Take  by mouth.     • carvedilol (COREG) 3.125 MG tablet Take 1 tablet by mouth 2 (Two) Times a Day With Meals. 180 tablet 2   • Cholecalciferol (DIALYVITE VITAMIN D3 MAX) 61603 units tablet Take 1 tablet by mouth.     • clopidogrel (PLAVIX) 75 MG tablet Take 1 tablet by mouth Daily. 90 tablet 2   • fluticasone (FLONASE) 50 MCG/ACT nasal spray 2 sprays into the nostril(s) as directed by provider.     • hydroCHLOROthiazide (HYDRODIURIL) 25 MG tablet Take 1 tablet by mouth  "Daily. 90 tablet 2   • mupirocin 2 % ointment Apply  topically to the appropriate area as directed.     • ferrous sulfate 324 (65 FE) MG tablet delayed-release EC tablet Take 324 mg by mouth Daily With Breakfast.     • meloxicam (MOBIC) 15 MG tablet TAKE 1 TABLET BY MOUTH DAILY. 30 tablet 1     No current facility-administered medications for this visit.            Review of Systems:     Constitutional:  Denies recent weight loss, weight gain, fever or chills, no change in exercise tolerance.     HENT:  Denies any hearing loss, epistaxis, hoarseness, or difficulty speaking.     Eyes: Wears eyeglasses or contact lenses.    Respiratory:  Denies dyspnea with exertion,no cough, wheezing, or hemoptysis.     Cardiovascular: Negative for palpations, chest pain, orthopnea, PND, peripheral edema, syncope, or claudication.     Gastrointestinal:  Denies change in bowel habits, dyspepsia, ulcer disease, hematochezia, or melena.     Endocrine: Negative for cold intolerance, heat intolerance, polydipsia, polyphagia and polyuria. Denies any history of weight change, polydipsia, polyuria.     Genitourinary: Negative.      Musculoskeletal: Denies any history of arthritic symptoms or back problems.     Skin:  Denies any change in hair or nails, rashes, or skin lesions.     Allergic/Immunologic: Negative.  Negative for environmental allergies, food allergies and immunocompromised state.     Neurological:  Denies any history of recurrent headaches, strokes, TIA, or seizure disorder.     Hematological: Denies any food allergies, seasonal allergies, bleeding disorders, or lymphadenopathy.     Psychiatric/Behavioral: Denies any history of depression, substance abuse, or change in cognitive function.       OBJECTIVE:    /78   Pulse 77   Ht 162.6 cm (64\")   Wt 80.7 kg (178 lb)   SpO2 99%   BMI 30.55 kg/m²       Physical Exam:     Constitutional: Cooperative, alert and oriented, well-developed, well-nourished, in no acute " distress.     HENT:   Head: Normocephalic, normal hair patterns, no masses or tenderness.  Ears, Nose, and Throat: No gross abnormalities. No pallor or cyanosis. Dentition good.   Eyes: EOMS intact, PERRL, conjunctivae and lids unremarkable. Fundoscopic exam and visual fields not performed.   Neck: No palpable masses or adenopathy, no thyromegaly, no JVD, carotid pulses are full and equal bilaterally and without  Bruits.     Cardiovascular: Regular rhythm, S1 and S2 normal, no S3 or S4. Apical impulse not displaced. No murmurs, gallops, or rubs detected.     Pulmonary/Chest: Chest: normal symmetry, no tenderness to palpation, normal respiratory excursion, no intercostal retraction, no use of accessory muscles. Pulmonary: Normal breath sounds. No rales or rhonchi.    Abdominal: Abdomen soft, bowel sounds normoactive, no masses, no hepatosplenomegaly, non-tender, no bruits.     Musculoskeletal: No deformities, clubbing, cyanosis, erythema, or edema observed. There are no spinal abnormalities noted. Normal muscle strength and tone. Pulses full and equal in all extremities, no bruits auscultated.     Neurological: No gross motor or sensory deficits noted, affect appropriate, oriented to time, person, place.     Skin: Warm and dry to the touch, no apparent skin lesions or masses noted.     Psychiatric: She has a normal mood and affect. Her behavior is normal. Judgment and thought content normal.         Procedures      Lab Results   Component Value Date    WBC 5.7 02/26/2019    HGB 11.1 (L) 02/26/2019    HCT 34.9 (L) 02/26/2019    MCV 92 02/26/2019     02/26/2019     Lab Results   Component Value Date    GLUCOSE 92 10/27/2014    BUN 13 02/26/2019    CREATININE 0.7 02/26/2019    CO2 24 10/27/2014    CALCIUM 8.9 02/26/2019    ALBUMIN 3.7 02/26/2019    AST 13 (L) 02/26/2019    ALT 21 (L) 02/26/2019     Lab Results   Component Value Date    CHOL 146 02/26/2019    CHOL 212 (H) 03/22/2018     Lab Results   Component  Value Date    TRIG 47 02/26/2019    TRIG 168 03/22/2018    TRIG 64 10/28/2014     Lab Results   Component Value Date    HDL 68 02/26/2019    HDL 68 03/22/2018    HDL 62 10/28/2014     No components found for: LDLCALC  Lab Results   Component Value Date    LDL 63 02/26/2019     (H) 03/22/2018    LDL 55 10/28/2014     No results found for: HDLLDLRATIO  No components found for: CHOLHDL  Lab Results   Component Value Date    HGBA1C 5.8 03/22/2018     Lab Results   Component Value Date    TSH 2.19 10/27/2014           ASSESSMENT AND PLAN:  #1 CAD status post PTCA stent of diagonal branch   #2 hypertension with hypertensive heart disease   #3 hyperlipidemia  #4  tricuspid regurgitation  Clinically there is no sign of any acute cardiac decompensation based the clinical history physical finding. No  Evidence of active ischemia.  Patient doing remarkably well from cardiac point of the patient Started on Norvasc as she is complaining of significant dry cough probably due to ACE inhibitor for hypertension with good blood pressure control.  Will increase the dose of Coreg to 6.25 mg twice a day as she had a history of intolerant to ACE and ARB significant of low carbohydrate, low-fat, DASH diet and graded exercise discussed with the patient.  Her lipid level is slightly abnormal but that was 2018 I will repeat hepatic lipid profile before adjusting the statin and also get the hemoglobin A1c is a previous hemoglobin A1c was 5.6 given the strong family history of diabetes.  Arrange an echocardiographic study to reassess the valvular function the last echo was in 2016.    Dominik was seen today for follow-up.    Diagnoses and all orders for this visit:    Atherosclerosis of native coronary artery of native heart without angina pectoris    Tricuspid valve disorders, specified as nonrheumatic (aka 424.2)    Dyspnea, unspecified type    Essential hypertension          Shan Baez MD  11/22/2019  9:21 AM

## 2019-12-02 ENCOUNTER — TELEPHONE (OUTPATIENT)
Dept: CARDIOLOGY | Facility: CLINIC | Age: 49
End: 2019-12-02

## 2019-12-02 NOTE — TELEPHONE ENCOUNTER
Patient informed and voiced understanding.       ----- Message from Cydney Snell RN sent at 12/2/2019  1:28 PM CST -----  Echo ok per Dr Baez

## 2020-07-21 ENCOUNTER — TRANSCRIBE ORDERS (OUTPATIENT)
Dept: PHYSICAL THERAPY | Facility: HOSPITAL | Age: 50
End: 2020-07-21

## 2020-07-21 DIAGNOSIS — M25.562 ACUTE PAIN OF LEFT KNEE: Primary | ICD-10-CM

## 2020-07-29 ENCOUNTER — HOSPITAL ENCOUNTER (OUTPATIENT)
Dept: PHYSICAL THERAPY | Facility: HOSPITAL | Age: 50
Setting detail: THERAPIES SERIES
Discharge: HOME | End: 2020-07-29

## 2020-07-29 DIAGNOSIS — M25.562 ACUTE PAIN OF LEFT KNEE: Primary | ICD-10-CM

## 2020-07-29 PROCEDURE — 97161 PT EVAL LOW COMPLEX 20 MIN: CPT | Performed by: PHYSICAL THERAPIST

## 2020-07-29 PROCEDURE — 97110 THERAPEUTIC EXERCISES: CPT | Performed by: PHYSICAL THERAPIST

## 2020-07-29 NOTE — THERAPY EVALUATION
Outpatient Physical Therapy Ortho Initial Evaluation  [facility]     Patient Name: Dominik Amaya  : 1970  MRN: [id number]  Today's Date: 2020      Visit Date: 2020  Attendance:  20v/yr  Subjective Improvement: NA  Next MD Appt: non scheduled  Recert Date: 20    Therapy Diagnosis: left knee pain  Allergies       Acetaminophen   Hydrocodone Bitartrate Er   Oxycodone   Mejia as Reviewed Reviewed by You at 4:08 PM CDT     Medications (Admitted on 2020)     amLODIPine (NORVASC) 5 MG tablet     atorvastatin (LIPITOR) 20 MG tablet     calcium-vitamin D (OSCAL) 250-125 MG-UNIT per tablet     carvedilol (COREG) 6.25 MG tablet     Cholecalciferol (DIALYVITE VITAMIN D3 MAX) 91179 units tablet     clopidogrel (PLAVIX) 75 MG tablet     ferrous sulfate 324 (65 FE) MG tablet delayed-release EC tablet     hydroCHLOROthiazide (HYDRODIURIL) 25 MG tablet     meloxicam (MOBIC) 15 MG tablet     mupirocin 2 % ointment        Patient Active Problem List   Diagnosis   • Coronary atherosclerosis of native coronary artery   • Tricuspid valve disorders, specified as nonrheumatic (aka 424.2)   • Dyspnea   • Essential hypertension        Past Medical History:   Diagnosis Date   • Coronary atherosclerosis of native coronary artery    • Dyspnea    • Hyperlipidemia    • Hypertension    • Tricuspid valve disorders, specified as nonrheumatic         Past Surgical History:   Procedure Laterality Date   • ANKLE SURGERY     • CARDIAC CATHETERIZATION         Visit Dx:     ICD-10-CM ICD-9-CM   1. Acute pain of left knee M25.562 719.46         Patient History     Row Name 20 1600             History    Chief Complaint  Pain  -SW      Type of Pain  Knee pain  -SW      Brief Description of Current Complaint  Patient reports insidious onset left knee pain about 1 month ago. Pain is intermittent. Knee sometimes hyper extends. Pain is primarily inferior to knee cap. 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   -SW      Patient/Caregiver Goals  Relieve pain;Return to prior level of function  -SW         Pain     Pain at Present  5  -SW      Pain at Best  10  -SW      Is your sleep disturbed?  No  -SW      Is medication used to assist with sleep?  No  -SW         Services    Prior Rehab/Home Health Experiences  No  -SW      Are you currently receiving Home Health services  No  -SW      Do you plan to receive Home Health services in the near future  No  -SW         Daily Activities    Primary Language  English  -SW      Barriers to learning  None  -SW      Pt Participated in POC and Goals  Yes  -SW         Safety    Are you being hurt, hit, or frightened by anyone at home or in your life?  No  -SW      Are you being neglected by a caregiver  No  -SW        User Key  (r) = Recorded By, (t) = Taken By, (c) = Cosigned By    Initials Name Provider Type    SW Jennifer Ga Physical Therapist          PT Ortho     Row Name 07/29/20 1600       Subjective Comments    Subjective Comments  Patient reports insidious onset left knee pain about 1 month ago. Pain is intermittent. Knee sometimes hyper extends. Pain is primarily inferior to knee cap. 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   -SW       Precautions and Contraindications    Precautions  perform all exercises bilaterally  -SW       Subjective Pain    Able to rate subjective pain?  yes  -SW    Pre-Treatment Pain Level  3  -SW       Posture/Observations    Posture/Observations Comments  mild swelling left knee/crepitus B knees, right > left/no antalgia with gait  -SW       Special Tests/Palpation    Special Tests/Palpation  -- +TTP left med jt line/+Agustin B/-ligamentous tests B  -SW       General ROM    GENERAL ROM COMMENTS  B hip and knee ROM WNL's  -SW       MMT Right Lower Ext    Rt Hip Flexion MMT, Gross Movement  (5/5) normal  -SW    Rt Hip Extension MMT, Gross  Movement  (4/5) good  -SW    Rt Hip ABduction MMT, Gross Movement  (4+/5) good plus  -SW    Rt Hip Internal (Medial) Rotation MMT, Gross Movement  (4/5) good  -SW    Rt Hip External (Lateral) Rotation MMT, Gross Movement  (5/5) normal  -SW    Rt Knee Extension MMT, Gross Movement  (5/5) normal  -SW    Rt Knee Flexion MMT, Gross Movement  (5/5) normal  -SW    Rt Ankle Plantarflexion MMT, Gross Movement  (5/5) normal  -SW    Rt Ankle Dorsiflexion MMT, Gross Movement  (5/5) normal  -SW       MMT Left Lower Ext    Lt Hip Flexion MMT, Gross Movement  (5/5) normal  -SW    Lt Hip Extension MMT, Gross Movement  (4/5) good  -SW    Lt Hip ABduction MMT, Gross Movement  (4+/5) good plus  -SW    Lt Hip Internal (Medial) Rotation MMT, Gross Movement  (4/5) good  -SW    Lt Hip External (Lateral) Rotation MMT, Gross Movement  (5/5) normal  -SW    Lt Knee Extension MMT, Gross Movement  (5/5) normal  -SW    Lt Knee Flexion MMT, Gross Movement  (5/5) normal  -SW    Lt Ankle Plantarflexion MMT, Gross Movement  (5/5) normal  -SW    Lt Ankle Dorsiflexion MMT, Gross Movement  (5/5) normal  -SW       Flexibility    Flexibility Tested?  -- mild tightness B quadriceps/hamstrings  -      User Key  (r) = Recorded By, (t) = Taken By, (c) = Cosigned By    Initials Name Provider Type    Jennifer Chicas Physical Therapist                      Therapy Education  Education Details: HEP per flowsheet today to be performed 1x/day  Given: HEP, Symptoms/condition management  Program: New  How Provided: Verbal, Demonstration, Written  Provided to: Patient  Level of Understanding: Teach back education performed, Verbalized, Demonstrated     PT OP Goals     Row Name 07/29/20 1600          PT Short Term Goals    STG Date to Achieve  08/19/20  -     STG 1  Patient will be independent with HEP.  -SW        Long Term Goals    LTG Date to Achieve  09/09/20  -     LTG 1  Patient will score 73 on LEFS.   -SW     LTG 2  Patient will exhibit bilateral hip  "strength 5/5 all directions.   -     LTG 3  Patient will perform sit to stand without UE support in 10\" or less.   -     LTG 4  Patient will exhibit normal quad flexibility bilaterally.   -     LTG 5  Patient will be able to perform step down 10 consecutive times from 6\" stair with good quad control and without holding.   -        Time Calculation    PT Goal Re-Cert Due Date  08/19/20  -       User Key  (r) = Recorded By, (t) = Taken By, (c) = Cosigned By    Initials Name Provider Type    Jennifer Chicas Physical Therapist          PT Assessment/Plan     Row Name 07/29/20 1600          PT Assessment    Functional Limitations  Limitation in home management;Limitations in community activities;Limitations in functional capacity and performance;Performance in leisure activities;Performance in self-care ADL  -     Impairments  Balance;Impaired muscle length;Muscle strength;Pain;Range of motion  -     Assessment Comments  49 yof presents with bilateral knee pain, left > right, with reduced bilateral LE strength, flexilbility and balance. She exhibits crepitus bilateral knees, right > left, and TTP at MJL on right knee. Treatment will focus on these impairments.   -     Rehab Potential  Excellent  -     Patient/caregiver participated in establishment of treatment plan and goals  Yes  -SW     Patient would benefit from skilled therapy intervention  Yes  -        PT Plan    PT Frequency  2x/week  -     Predicted Duration of Therapy Intervention (Therapy Eval)  6 weeks  -     Planned CPT's?  PT EVAL LOW COMPLEXITY: 33547;PT EVAL HIGH COMPLEXITY: 64020;PT RE-EVAL: 74171;PT THER PROC EA 15 MIN: 45506;PT THER ACT EA 15 MIN: 82769;PT MANUAL THERAPY EA 15 MIN: 02845;PT NEUROMUSC RE-EDUCATION EA 15 MIN: 39648;PT SELF CARE/HOME MGMT/TRAIN EA 15: 18273;PT HOT OR COLD PACK TREAT [facility]ARE  -     Physical Therapy Interventions (Optional Details)  balance training;home exercise program;manual therapy " techniques;neuromuscular re-education;patient/family education;ROM (Range of Motion);stair training;strengthening;stretching  -     PT Plan Comments  Focus on quad and hip strengthening.   -       User Key  (r) = Recorded By, (t) = Taken By, (c) = Cosigned By    Initials Name Provider Type    Jennifer Chicas Physical Therapist            OP Exercises     Row Name 07/29/20 1600             Subjective Comments    Subjective Comments  Patient reports insidious onset left knee pain about 1 month ago. Pain is intermittent. Knee sometimes hyper extends. Pain is primarily inferior to knee cap. 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   -         Subjective Pain    Able to rate subjective pain?  yes  -      Pre-Treatment Pain Level  3  -         Exercise 1    Exercise Name 1  SLR-flexion and abduction  -SW      Reps 1  20 ea side ea way  -SW         Exercise 2    Exercise Name 2  bridging  -SW      Reps 2  20  -SW         Exercise 3    Exercise Name 3  sit to stand no UE support  -SW      Reps 3  20  -SW        User Key  (r) = Recorded By, (t) = Taken By, (c) = Cosigned By    Initials Name Provider Type    Jennifer Chicas Physical Therapist                        Outcome Measure Options: Lower Extremity Functional Scale (LEFS), 5x Sit to Stand  5 Times Sit to Stand  5 Times Sit to Stand (seconds): 10.44 seconds  5 Times Sit to Stand Comments: no UE support  Lower Extremity Functional Index  Any of your usual work, housework or school activities: A little bit of difficulty  Your usual hobbies, recreational or sporting activities: A little bit of difficulty  Getting into or out of the bath: No difficulty  Walking between rooms: No difficulty  Putting on your shoes or socks: No difficulty  Squatting: A little bit of difficulty  Lifting an object, like a bag of groceries from the floor: No difficulty  Performing light activities around your home: A little bit  of difficulty  Performing heavy activities around your home: A little bit of difficulty  Getting into or out of a car: A little bit of difficulty  Walking 2 blocks: A little bit of difficulty  Walking a mile: A little bit of difficulty  Going up or down 10 stairs (about 1 flight of stairs): A little bit of difficulty  Standing for 1 hour: No difficulty  Sitting for 1 hour: No difficulty  Running on even ground: A little bit of difficulty  Running on uneven ground: A little bit of difficulty  Making sharp turns while running fast: Quite a bit of difficulty  Hopping: Quite a bit of difficulty  Rolling over in bed: No difficulty  Total: 63      Time Calculation:     Start Time: 1558  Stop Time: 1638  Time Calculation (min): 40 min     Therapy Charges for Today     Code Description Service Date Service Provider Modifiers Qty    64417384498 HC PT EVAL LOW COMPLEXITY 1 7/29/2020 Jennifer Ga GP 1    24037984720 HC PT THER PROC EA 15 MIN 7/29/2020 Jennifer Ga GP 2          PT G-Codes  Outcome Measure Options: Lower Extremity Functional Scale (LEFS), 5x Sit to Stand  Total: 63         Jennifer Ga  7/29/2020

## 2020-08-03 ENCOUNTER — HOSPITAL ENCOUNTER (OUTPATIENT)
Dept: PHYSICAL THERAPY | Facility: HOSPITAL | Age: 50
Setting detail: THERAPIES SERIES
Discharge: HOME | End: 2020-08-03

## 2020-08-03 DIAGNOSIS — M25.562 ACUTE PAIN OF LEFT KNEE: Primary | ICD-10-CM

## 2020-08-03 PROCEDURE — 97110 THERAPEUTIC EXERCISES: CPT

## 2020-08-03 NOTE — THERAPY TREATMENT NOTE
Outpatient Physical Therapy Ortho Treatment Note  [facility]     Patient Name: Dominik Amaya  : 1970  MRN: [id number]  Today's Date: 8/3/2020      Visit Date: 2020     Subjective Improvement some  Visits 2/2  Visits approved 6 plus eval from 2020 to 2020  RTMD PRN  Recert Date 2020    Left knee pain    Visit Dx:    ICD-10-CM ICD-9-CM   1. Acute pain of left knee M25.562 719.46       Patient Active Problem List   Diagnosis   • Coronary atherosclerosis of native coronary artery   • Tricuspid valve disorders, specified as nonrheumatic (aka 424.2)   • Dyspnea   • Essential hypertension        Past Medical History:   Diagnosis Date   • Coronary atherosclerosis of native coronary artery    • Dyspnea    • Hyperlipidemia    • Hypertension    • Tricuspid valve disorders, specified as nonrheumatic         Past Surgical History:   Procedure Laterality Date   • ANKLE SURGERY     • CARDIAC CATHETERIZATION                         PT Assessment/Plan     Row Name 20 0697          PT Assessment    Assessment Comments  Slight edema noted in bilateral knnes with right greater than left  -CP        PT Plan    PT Frequency  2x/week  -CP     Predicted Duration of Therapy Intervention (Therapy Eval)  6 weels  -CP     PT Plan Comments  Cont with POC. Prone quad stretch  -CP       User Key  (r) = Recorded By, (t) = Taken By, (c) = Cosigned By    Initials Name Provider Type    Clarisse Patterson, PTA Physical Therapy Assistant            OP Exercises     Row Name 20 1700             Subjective Comments    Subjective Comments  Patient reprots decrease pain and states that she has been doing ex at home.  -CP         Subjective Pain    Able to rate subjective pain?  yes  -CP      Pre-Treatment Pain Level  2  -CP         Exercise 1    Exercise Name 1  Pro II level 2  -CP      Time 1  10  -CP         Exercise 2    Exercise Name 2  incline stretch  -CP      Cueing 2  Verbal;Demo  -CP      Sets  "2  3  -CP      Time 2  30  -CP         Exercise 3    Exercise Name 3  LAQ  -CP      Cueing 3  Verbal;Demo  -CP      Sets 3  2  -CP      Reps 3  10  -CP      Time 3  5' holds  -CP         Exercise 4    Exercise Name 4  seaed knee fl wih tband  -CP      Cueing 4  Verbal;Tactile  -CP      Sets 4  2  -CP      Reps 4  10  -CP      Time 4  green tband  -CP         Exercise 5    Exercise Name 5  bridging with tband isometrics holds  -CP      Cueing 5  Verbal;Tactile  -CP      Sets 5  1  -CP      Reps 5  10  -CP      Time 5  10\" holds  -CP         Exercise 6    Exercise Name 6  clamshells  -CP      Cueing 6  Verbal;Tactile  -CP      Sets 6  2  -CP      Reps 6  10  -CP      Time 6  bilateral  -CP         Exercise 7    Exercise Name 7  QS  -CP      Cueing 7  Verbal;Tactile  -CP      Sets 7  10  -CP      Reps 7  5' holds  -CP      Time 7  bilatel  -CP         Exercise 8    Exercise Name 8  SAQ  -CP      Cueing 8  Verbal;Tactile  -CP      Sets 8  2  -CP      Reps 8  10  -CP      Time 8  5' holds  -CP      Additional Comments  bilateral  -CP         Exercise 9    Exercise Name 9  TKE with tband  -CP      Cueing 9  Verbal;Tactile  -CP      Sets 9  2  -CP      Reps 9  10  -CP      Time 9  green  -CP        User Key  (r) = Recorded By, (t) = Taken By, (c) = Cosigned By    Initials Name Provider Type    CP Clarisse Chapman, PTA Physical Therapy Assistant                       PT OP Goals     Row Name 08/03/20 1700          PT Short Term Goals    STG Date to Achieve  08/19/20  -CP     STG 1  Patient will be independent with HEP.  -CP     STG 1 Progress  Ongoing  -CP        Long Term Goals    LTG Date to Achieve  09/09/20  -CP     LTG 1  Patient will score 73 on LEFS.   -CP     LTG 1 Progress  Not Met  -CP     LTG 2  Patient will exhibit bilateral hip strength 5/5 all directions.   -CP     LTG 2 Progress  Progressing  -CP     LTG 3  Patient will perform sit to stand without UE support in 10\" or less.   -CP     LTG 3 Progress  " "Progressing  -CP     LTG 4  Patient will exhibit normal quad flexibility bilaterally.   -CP     LTG 4 Progress  Progressing  -CP     LTG 5  Patient will be able to perform step down 10 consecutive times from 6\" stair with good quad control and without holding.   -CP     LTG 5 Progress  Progressing  -CP        Time Calculation    PT Goal Re-Cert Due Date  08/19/20  -CP       User Key  (r) = Recorded By, (t) = Taken By, (c) = Cosigned By    Initials Name Provider Type    CP Clarisse Chapman PTA Physical Therapy Assistant          Therapy Education  Education Details: see ex flowsheet  Given: HEP  Program: New  How Provided: Verbal, Demonstration, Written  Provided to: Patient  Level of Understanding: Teach back education performed, Demonstrated, Verbalized              Time Calculation:   Start Time: 1658  Stop Time: 1740  Time Calculation (min): 42 min  Total Timed Code Minutes- PT: 42 minute(s)  Therapy Charges for Today     Code Description Service Date Service Provider Modifiers Qty    81327701510 HC PT THER PROC EA 15 MIN 8/3/2020 Clarisse Chapman PTA GP 3                    Clarisse Chapman PTA  8/3/2020     "

## 2020-08-05 ENCOUNTER — HOSPITAL ENCOUNTER (OUTPATIENT)
Dept: PHYSICAL THERAPY | Facility: HOSPITAL | Age: 50
Setting detail: THERAPIES SERIES
Discharge: HOME | End: 2020-08-05

## 2020-08-05 DIAGNOSIS — M25.562 ACUTE PAIN OF LEFT KNEE: Primary | ICD-10-CM

## 2020-08-05 PROCEDURE — 97110 THERAPEUTIC EXERCISES: CPT

## 2020-08-10 ENCOUNTER — HOSPITAL ENCOUNTER (OUTPATIENT)
Dept: PHYSICAL THERAPY | Facility: HOSPITAL | Age: 50
Setting detail: THERAPIES SERIES
Discharge: HOME | End: 2020-08-10

## 2020-08-10 DIAGNOSIS — M25.562 ACUTE PAIN OF LEFT KNEE: Primary | ICD-10-CM

## 2020-08-10 PROCEDURE — 97110 THERAPEUTIC EXERCISES: CPT

## 2020-08-10 NOTE — THERAPY TREATMENT NOTE
Outpatient Physical Therapy Ortho Treatment Note  [facility]     Patient Name: Dominik Amaya  : 1970  MRN: [id number]  Today's Date: 8/10/2020      Visit Date: 08/10/2020     Subjective Improvement 505  Visits 4/4  Visits approved 6 plus eval from 2020 to 2020  RTMD PRN  Recert Date 2020    Left knee pain    Visit Dx:    ICD-10-CM ICD-9-CM   1. Acute pain of left knee M25.562 719.46       Patient Active Problem List   Diagnosis   • Coronary atherosclerosis of native coronary artery   • Tricuspid valve disorders, specified as nonrheumatic (aka 424.2)   • Dyspnea   • Essential hypertension        Past Medical History:   Diagnosis Date   • Coronary atherosclerosis of native coronary artery    • Dyspnea    • Hyperlipidemia    • Hypertension    • Tricuspid valve disorders, specified as nonrheumatic         Past Surgical History:   Procedure Laterality Date   • ANKLE SURGERY     • CARDIAC CATHETERIZATION                         PT Assessment/Plan     Row Name 08/10/20 1639          PT Assessment    Assessment Comments  No real pain with step up or down but patient did have some increase pain with independent sit to stands.  -CP        PT Plan    PT Frequency  2x/week  -CP     Predicted Duration of Therapy Intervention (Therapy Eval)  6 weeks  -CP     PT Plan Comments  cont with POC. multi hip next  -CP       User Key  (r) = Recorded By, (t) = Taken By, (c) = Cosigned By    Initials Name Provider Type    Clarisse Patterson, PTA Physical Therapy Assistant            OP Exercises     Row Name 08/10/20 1600             Subjective Comments    Subjective Comments  Patient states that her knee is a little ginger.  and reports feeling 50% better.  -CP         Subjective Pain    Able to rate subjective pain?  yes  -CP      Pre-Treatment Pain Level  2  -CP      Post-Treatment Pain Level  2  -CP         Exercise 1    Exercise Name 1  pro II level 3  -CP      Time 1  10  -CP         Exercise 2     "Exercise Name 2  incline stretch  -CP      Cueing 2  Verbal  -CP      Sets 2  3  -CP      Time 2  30  -CP         Exercise 3    Exercise Name 3  lunge stretch  -CP      Cueing 3  Verbal  -CP      Sets 3  3  -CP      Time 3  30  -CP         Exercise 4    Exercise Name 4  step up 6\"  -CP      Cueing 4  Verbal;Demo  -CP      Sets 4  2  -CP      Reps 4  10  -CP         Exercise 5    Exercise Name 5  lat step up 4'  -CP      Cueing 5  Verbal;Demo  -CP      Sets 5  2  -CP      Reps 5  10  -CP         Exercise 6    Exercise Name 6  step down 4\"  -CP      Cueing 6  Verbal;Demo  -CP      Sets 6  2  -CP      Reps 6  10  -CP         Exercise 7    Exercise Name 7  cybex hip AB  -CP      Cueing 7  Verbal;Demo  -CP      Sets 7  3  -CP      Reps 7  10  -CP      Time 7  30 lb  -CP         Exercise 8    Exercise Name 8  cybex leg press  -CP      Cueing 8  Verbal;Tactile  -CP      Sets 8  2  -CP      Reps 8  10  -CP      Time 8  75 lb  -CP         Exercise 9    Exercise Name 9  sit to stand  -CP      Reps 9  1  -CP      Time 9  painful  -CP         Exercise 10    Exercise Name 10  prone quad stretch  -CP      Cueing 10  Verbal;Tactile  -CP      Sets 10  3  -CP      Time 10  30  -CP        User Key  (r) = Recorded By, (t) = Taken By, (c) = Cosigned By    Initials Name Provider Type    Clarisse Patterson, PTA Physical Therapy Assistant                       PT OP Goals     Row Name 08/10/20 1600          PT Short Term Goals    STG Date to Achieve  08/19/20  -CP     STG 1  Patient will be independent with HEP.  -CP     STG 1 Progress  Ongoing  -CP        Long Term Goals    LTG Date to Achieve  09/09/20  -CP     LTG 1  Patient will score 73 on LEFS.   -CP     LTG 1 Progress  Not Met  -CP     LTG 2  Patient will exhibit bilateral hip strength 5/5 all directions.   -CP     LTG 2 Progress  Progressing  -CP     LTG 3  Patient will perform sit to stand without UE support in 10\" or less.   -CP     LTG 3 Progress  Progressing  -CP     LTG 4  " "Patient will exhibit normal quad flexibility bilaterally.   -CP     LTG 4 Progress  Progressing  -CP     LTG 5  Patient will be able to perform step down 10 consecutive times from 6\" stair with good quad control and without holding.   -CP     LTG 5 Progress  Progressing  -CP        Time Calculation    PT Goal Re-Cert Due Date  08/19/20  -CP       User Key  (r) = Recorded By, (t) = Taken By, (c) = Cosigned By    Initials Name Provider Type    CP Clarisse Chapman PTA Physical Therapy Assistant                         Time Calculation:   Start Time: 1605  Stop Time: 1643  Time Calculation (min): 38 min  Total Timed Code Minutes- PT: 38 minute(s)  Therapy Charges for Today     Code Description Service Date Service Provider Modifiers Qty    34707779048 HC PT THER PROC EA 15 MIN 8/10/2020 Clarisse Chapman PTA GP 3                    Clarisse Chapman PTA  8/10/2020     "

## 2020-08-12 ENCOUNTER — HOSPITAL ENCOUNTER (OUTPATIENT)
Dept: PHYSICAL THERAPY | Facility: HOSPITAL | Age: 50
Setting detail: THERAPIES SERIES
Discharge: HOME | End: 2020-08-12

## 2020-08-12 DIAGNOSIS — M25.562 ACUTE PAIN OF LEFT KNEE: Primary | ICD-10-CM

## 2020-08-12 PROCEDURE — 97110 THERAPEUTIC EXERCISES: CPT

## 2020-08-12 NOTE — THERAPY TREATMENT NOTE
Outpatient Physical Therapy Ortho Treatment Note  [facility]     Patient Name: Dominik Amaya  : 1970  MRN: [id number]  Today's Date: 2020      Visit Date: 2020     Subjective Improvement 50%  Visits 5/5  Visits approved 6 plus eval from 2020 to 2020  RTMD PRN  Recert Date 2020    Left Knee Pain    Visit Dx:    ICD-10-CM ICD-9-CM   1. Acute pain of left knee M25.562 719.46       Patient Active Problem List   Diagnosis   • Coronary atherosclerosis of native coronary artery   • Tricuspid valve disorders, specified as nonrheumatic (aka 424.2)   • Dyspnea   • Essential hypertension        Past Medical History:   Diagnosis Date   • Coronary atherosclerosis of native coronary artery    • Dyspnea    • Hyperlipidemia    • Hypertension    • Tricuspid valve disorders, specified as nonrheumatic         Past Surgical History:   Procedure Laterality Date   • ANKLE SURGERY     • CARDIAC CATHETERIZATION                         PT Assessment/Plan     Row Name 20 1640          PT Assessment    Assessment Comments  No pain with ther ex but patient did report a poppin in left knee  -CP        PT Plan    PT Frequency  2x/week  -CP     Predicted Duration of Therapy Intervention (Therapy Eval)  6 weeks  -CP     PT Plan Comments  Cont with POC.  review all goals.  -CP       User Key  (r) = Recorded By, (t) = Taken By, (c) = Cosigned By    Initials Name Provider Type    CP Clarisse Chapman, PTA Physical Therapy Assistant            OP Exercises     Row Name 20 1600             Subjective Comments    Subjective Comments  Patient reports no pain today.  She did work all day without pain.  -CP         Subjective Pain    Able to rate subjective pain?  yes  -CP      Pre-Treatment Pain Level  0  -CP         Exercise 1    Exercise Name 1  Pro II level 3  -CP      Time 1  10  -CP         Exercise 2    Exercise Name 2  incline stretch  -CP      Cueing 2  Verbal  -CP      Sets 2  3  -CP       "Time 2  30  -CP         Exercise 3    Exercise Name 3  Lunge stretch  -CP      Cueing 3  Verbal  -CP      Sets 3  3  -CP      Time 3  30  -CP         Exercise 4    Exercise Name 4  BOSU step up  -CP      Cueing 4  Verbal;Demo  -CP      Sets 4  2  -CP      Reps 4  10  -CP         Exercise 5    Exercise Name 5  BOSU lat step up  -CP      Cueing 5  Verbal;Demo  -CP      Sets 5  2  -CP      Reps 5  10  -CP         Exercise 6    Exercise Name 6  step downs 4\"  -CP      Cueing 6  Verbal;Demo  -CP      Sets 6  2  -CP      Reps 6  10  -CP         Exercise 7    Exercise Name 7  cybex leg press   -CP      Cueing 7  Verbal;Tactile  -CP      Sets 7  3  -CP      Reps 7  10  -CP      Time 7  80 lb  -CP         Exercise 8    Exercise Name 8  cybex hip AB  -CP      Cueing 8  Verbal;Tactile  -CP      Sets 8  3  -CP      Reps 8  10  -CP      Time 8  30 lb  -CP         Exercise 9    Exercise Name 9  SL sliders  -CP      Cueing 9  Verbal;Demo  -CP      Reps 9  20  -CP      Time 9  CW/CCW  -CP        User Key  (r) = Recorded By, (t) = Taken By, (c) = Cosigned By    Initials Name Provider Type    CP Clarisse Chapman, PTA Physical Therapy Assistant                       PT OP Goals     Row Name 08/12/20 1600          PT Short Term Goals    STG Date to Achieve  08/19/20  -CP     STG 1  Patient will be independent with HEP.  -CP     STG 1 Progress  Ongoing  -CP        Long Term Goals    LTG Date to Achieve  09/09/20  -CP     LTG 1  Patient will score 73 on LEFS.   -CP     LTG 1 Progress  Not Met  -CP     LTG 2  Patient will exhibit bilateral hip strength 5/5 all directions.   -CP     LTG 2 Progress  Progressing  -CP     LTG 3  Patient will perform sit to stand without UE support in 10\" or less.   -CP     LTG 3 Progress  Progressing  -CP     LTG 4  Patient will exhibit normal quad flexibility bilaterally.   -CP     LTG 4 Progress  Progressing  -CP     LTG 5  Patient will be able to perform step down 10 consecutive times from 6\" stair with " good quad control and without holding.   -CP     LTG 5 Progress  Progressing  -CP        Time Calculation    PT Goal Re-Cert Due Date  08/19/20  -CP       User Key  (r) = Recorded By, (t) = Taken By, (c) = Cosigned By    Initials Name Provider Type    CP Clarisse Chapman, LIZBETH Physical Therapy Assistant                         Time Calculation:   Start Time: 1605  Stop Time: 1645  Time Calculation (min): 40 min  Total Timed Code Minutes- PT: 40 minute(s)  Therapy Charges for Today     Code Description Service Date Service Provider Modifiers Qty    86187481573 HC PT THER PROC EA 15 MIN 8/12/2020 Clarisse Chapman PTA GP 3                    Clarisse Chapman PTA  8/12/2020

## 2020-08-17 ENCOUNTER — HOSPITAL ENCOUNTER (OUTPATIENT)
Dept: PHYSICAL THERAPY | Facility: HOSPITAL | Age: 50
Setting detail: THERAPIES SERIES
Discharge: HOME | End: 2020-08-17

## 2020-08-17 DIAGNOSIS — M25.562 ACUTE PAIN OF LEFT KNEE: Primary | ICD-10-CM

## 2020-08-17 PROCEDURE — 97110 THERAPEUTIC EXERCISES: CPT

## 2020-08-17 NOTE — THERAPY TREATMENT NOTE
Outpatient Physical Therapy Ortho Treatment Note  [facility]     Patient Name: Dominik Amaya  : 1970  MRN: [id number]  Today's Date: 2020      Visit Date: 2020    Subjective Improvement 50%  Visits 6/6  Visits approved 6 plus eval from 2020 to 2020  RTMD PRN  Recert Date 2020      Visit Dx:    ICD-10-CM ICD-9-CM   1. Acute pain of left knee M25.562 719.46       Patient Active Problem List   Diagnosis   • Coronary atherosclerosis of native coronary artery   • Tricuspid valve disorders, specified as nonrheumatic (aka 424.2)   • Dyspnea   • Essential hypertension        Past Medical History:   Diagnosis Date   • Coronary atherosclerosis of native coronary artery    • Dyspnea    • Hyperlipidemia    • Hypertension    • Tricuspid valve disorders, specified as nonrheumatic         Past Surgical History:   Procedure Laterality Date   • ANKLE SURGERY     • CARDIAC CATHETERIZATION                         PT Assessment/Plan     Row Name 20 1642          PT Assessment    Assessment Comments  Good tolerance to ther ex.  slight edema noted in Left knee.  No increase pain with ex.  -CP        PT Plan    PT Frequency  2x/week  -CP     Predicted Duration of Therapy Intervention (Therapy Eval)  6 weeks  -CP     PT Plan Comments  one more approved visit.  Will need to request additional visits if patient is to cont therapy.  -CP       User Key  (r) = Recorded By, (t) = Taken By, (c) = Cosigned By    Initials Name Provider Type    Clarisse Patterson, PTA Physical Therapy Assistant            OP Exercises     Row Name 20 1600             Subjective Comments    Subjective Comments  Reports some pain today and just got off work.   She is going to call her MD to try to get an MRI of the knee  -CP         Subjective Pain    Able to rate subjective pain?  yes  -CP      Pre-Treatment Pain Level  2  -CP      Post-Treatment Pain Level  2  -CP         Exercise 1    Exercise Name 1  Pro  II level 3  -CP      Time 1  10  -CP         Exercise 2    Exercise Name 2  incline stretch  -CP      Cueing 2  Verbal  -CP      Sets 2  3  -CP      Time 2  30  -CP         Exercise 3    Exercise Name 3  standing HS stretch  -CP      Cueing 3  Verbal  -CP      Sets 3  3  -CP      Time 3  30  -CP         Exercise 4    Exercise Name 4  BOSU step up  -CP      Cueing 4  Verbal  -CP      Sets 4  2  -CP      Reps 4  10  -CP         Exercise 5    Exercise Name 5  Bosu lat step up  -CP      Cueing 5  Verbal  -CP      Sets 5  2  -CP      Reps 5  10  -CP         Exercise 6    Exercise Name 6  TKE on CC  -CP      Cueing 6  Verbal;Demo  -CP      Sets 6  2  -CP      Reps 6  10  -CP      Time 6  4 plates  -CP         Exercise 7    Exercise Name 7  resisted walks on CC  -CP      Cueing 7  Verbal;Demo  -CP      Reps 7  5  -CP      Time 7  5 plates  -CP      Additional Comments  Forward/backward  -CP         Exercise 8    Exercise Name 8  resisted walks on cc  -CP      Cueing 8  Verbal;Demo  -CP      Reps 8  5  -CP      Time 8  4 plates  -CP      Additional Comments  side stepping  -CP         Exercise 9    Exercise Name 9  mutli hip fl  -CP      Cueing 9  Verbal;Demo  -CP      Sets 9  2  -CP      Reps 9  10  -CP      Time 9  1 plates  -CP         Exercise 10    Exercise Name 10  Multi hip AB  -CP      Cueing 10  Verbal;Auditory  -CP      Sets 10  2  -CP      Reps 10  10  -CP      Time 10  1 plates  -CP         Exercise 11    Exercise Name 11  sit to stand from standard chair  -CP      Cueing 11  Verbal;Tactile  -CP      Reps 11  5  -CP        User Key  (r) = Recorded By, (t) = Taken By, (c) = Cosigned By    Initials Name Provider Type    Clarisse Patterson, PTA Physical Therapy Assistant                       PT OP Goals     Row Name 08/17/20 1600          PT Short Term Goals    STG Date to Achieve  08/19/20  -CP     STG 1  Patient will be independent with HEP.  -CP     STG 1 Progress  Ongoing  -CP        Long Term Goals    LTG  "Date to Achieve  09/09/20  -CP     LTG 1  Patient will score 73 on LEFS.   -CP     LTG 1 Progress  Not Met  -CP     LTG 2  Patient will exhibit bilateral hip strength 5/5 all directions.   -CP     LTG 2 Progress  Progressing  -CP     LTG 3  Patient will perform sit to stand without UE support in 10\" or less.   -CP     LTG 3 Progress  Met  -CP     LTG 4  Patient will exhibit normal quad flexibility bilaterally.   -CP     LTG 4 Progress  Progressing  -CP     LTG 5  Patient will be able to perform step down 10 consecutive times from 6\" stair with good quad control and without holding.   -CP     LTG 5 Progress  Progressing  -CP        Time Calculation    PT Goal Re-Cert Due Date  08/19/20  -CP       User Key  (r) = Recorded By, (t) = Taken By, (c) = Cosigned By    Initials Name Provider Type    CP Clarisse Chapman PTA Physical Therapy Assistant                         Time Calculation:   Start Time: 1603  Stop Time: 1643  Time Calculation (min): 40 min  Total Timed Code Minutes- PT: 40 minute(s)  Therapy Charges for Today     Code Description Service Date Service Provider Modifiers Qty    25423344104 HC PT THER PROC EA 15 MIN 8/17/2020 Clarisse Chapman PTA GP 3                    Clarisse Chapman PTA  8/17/2020     "

## 2020-08-19 ENCOUNTER — HOSPITAL ENCOUNTER (OUTPATIENT)
Dept: PHYSICAL THERAPY | Facility: HOSPITAL | Age: 50
Setting detail: THERAPIES SERIES
Discharge: HOME | End: 2020-08-19

## 2020-08-19 DIAGNOSIS — M25.562 ACUTE PAIN OF LEFT KNEE: Primary | ICD-10-CM

## 2020-08-19 PROCEDURE — 97110 THERAPEUTIC EXERCISES: CPT | Performed by: PHYSICAL THERAPIST

## 2020-08-19 NOTE — THERAPY PROGRESS REPORT/RE-CERT
Outpatient Physical Therapy Ortho Progress Note  [facility]     Patient Name: Dominik Amaya  : 1970  MRN: [id number]  Today's Date: 2020      Visit Date: 2020  Subjective Improvement 75%  Visits 7/7  Visits approved 6 plus eval from 2020 to 2020  RTMD PRN  Recert Date 2020  Visit Dx:    ICD-10-CM ICD-9-CM   1. Acute pain of left knee M25.562 719.46       Patient Active Problem List   Diagnosis   • Coronary atherosclerosis of native coronary artery   • Tricuspid valve disorders, specified as nonrheumatic (aka 424.2)   • Dyspnea   • Essential hypertension        Past Medical History:   Diagnosis Date   • Coronary atherosclerosis of native coronary artery    • Dyspnea    • Hyperlipidemia    • Hypertension    • Tricuspid valve disorders, specified as nonrheumatic         Past Surgical History:   Procedure Laterality Date   • ANKLE SURGERY     • CARDIAC CATHETERIZATION         PT Ortho     Row Name 20 1600       Subjective Comments    Subjective Comments  Patient reports she is about 75% better since starting PT.   -SW       Precautions and Contraindications    Precautions  perform all exercises bilaterally  -SW       Subjective Pain    Post-Treatment Pain Level  2  -SW       Posture/Observations    Posture/Observations Comments  mild swelling left knee/crepitus B knees, right > left/no antalgia with gait  -SW       Special Tests/Palpation    Special Tests/Palpation  -- +TTP left med jt line/-Agustin B  -SW       MMT Right Lower Ext    Rt Hip Flexion MMT, Gross Movement  (5/5) normal  -SW    Rt Hip Extension MMT, Gross Movement  (4+/5) good plus  -SW    Rt Hip ABduction MMT, Gross Movement  (4+/5) good plus  -SW    Rt Hip Internal (Medial) Rotation MMT, Gross Movement  (4+/5) good plus  -SW    Rt Hip External (Lateral) Rotation MMT, Gross Movement  (5/5) normal  -SW    Rt Knee Extension MMT, Gross Movement  (5/5) normal  -SW    Rt Knee Flexion MMT, Gross Movement  (5/5)  normal  -SW    Rt Ankle Plantarflexion MMT, Gross Movement  (5/5) normal  -SW    Rt Ankle Dorsiflexion MMT, Gross Movement  (5/5) normal  -SW       MMT Left Lower Ext    Lt Hip Flexion MMT, Gross Movement  (5/5) normal  -SW    Lt Hip Extension MMT, Gross Movement  (4+/5) good plus  -SW    Lt Hip ABduction MMT, Gross Movement  (4+/5) good plus  -SW    Lt Hip Internal (Medial) Rotation MMT, Gross Movement  (4+/5) good plus  -SW    Lt Hip External (Lateral) Rotation MMT, Gross Movement  (5/5) normal  -SW    Lt Knee Extension MMT, Gross Movement  (5/5) normal  -SW    Lt Knee Flexion MMT, Gross Movement  (5/5) normal  -SW    Lt Ankle Plantarflexion MMT, Gross Movement  (5/5) normal  -SW    Lt Ankle Dorsiflexion MMT, Gross Movement  (5/5) normal  -SW       Flexibility    Flexibility Tested?  -- slight  tightness B quadriceps/hamstrings  -      User Key  (r) = Recorded By, (t) = Taken By, (c) = Cosigned By    Initials Name Provider Type     Jennifer Ga Physical Therapist                      PT Assessment/Plan     Row Name 08/19/20 1600          PT Assessment    Functional Limitations  Limitation in home management;Limitations in community activities;Limitations in functional capacity and performance;Performance in leisure activities;Performance in self-care ADL  -     Impairments  Balance;Impaired muscle length;Muscle strength;Pain;Range of motion  -     Assessment Comments  Patient exhibit an improvement in LE strength and flexibility as well as a concomitant improvement in function. She still has room for further improvement in these areas.   -     Rehab Potential  Excellent  -     Patient/caregiver participated in establishment of treatment plan and goals  Yes  -     Patient would benefit from skilled therapy intervention  Yes  -SW        PT Plan    PT Frequency  2x/week  -     Predicted Duration of Therapy Intervention (Therapy Eval)  3 more weeks  -     Planned CPT's?  PT RE-EVAL: 72554;PT THER  "PROC EA 15 MIN: 13874;PT THER ACT EA 15 MIN: 72310;PT MANUAL THERAPY EA 15 MIN: 78007;PT NEUROMUSC RE-EDUCATION EA 15 MIN: 86266;PT SELF CARE/HOME MGMT/TRAIN EA 15: 47732;PT HOT OR COLD PACK TREAT MCARE  -SW     Physical Therapy Interventions (Optional Details)  balance training;gait training;home exercise program;manual therapy techniques;neuromuscular re-education;patient/family education;stair training;strengthening;stretching  -SW     PT Plan Comments  Focus on eccentric quad strength such as step downs and pulses and hip extension and IR strength.   -SW       User Key  (r) = Recorded By, (t) = Taken By, (c) = Cosigned By    Initials Name Provider Type    Jennifer Chicas Physical Therapist            OP Exercises     Row Name 08/19/20 1600             Subjective Comments    Subjective Comments  Patient reports she is about 75% better since starting PT.   -SW         Subjective Pain    Able to rate subjective pain?  yes  -SW      Pre-Treatment Pain Level  2  -SW      Post-Treatment Pain Level  2  -SW         Exercise 1    Exercise Name 1  Pro II level 4  -SW      Time 1  10  -SW         Exercise 2    Exercise Name 2  pulses and step downs 4\"  -SW      Reps 2  20 ea way on left  -SW         Exercise 3    Exercise Name 3  553 airex  -SW         Exercise 4    Exercise Name 4  step up w high knee BOSU  -SW      Reps 4  20 left only  -SW         Exercise 5    Exercise Name 5  step up 6\"  -SW      Reps 5  10 leading right, 10 leding left  -SW         Exercise 6    Exercise Name 6  sliders 4 way  -SW      Reps 6  10 ea way  -SW         Exercise 7    Exercise Name 7  bridging, clams, reverse clams red   -SW      Reps 7  20 ea  -SW        User Key  (r) = Recorded By, (t) = Taken By, (c) = Cosigned By    Initials Name Provider Type    Jennifer Chicas Physical Therapist                       PT OP Goals     Row Name 08/19/20 1600          PT Short Term Goals    STG Date to Achieve  08/19/20  -     STG 1  Patient will be " "independent with HEP.  -     STG 1 Progress  Ongoing;Progressing  -        Long Term Goals    LTG Date to Achieve  09/09/20  -     LTG 1  Patient will score 73 on LEFS.   -     LTG 1 Progress  Met  -     LTG 2  Patient will exhibit bilateral hip strength 5/5 all directions.   -     LTG 2 Progress  Progressing  -     LTG 3  Patient will perform sit to stand without UE support in 10\" or less.   -     LTG 3 Progress  Met  -     LTG 4  Patient will exhibit normal quad flexibility bilaterally.   -     LTG 4 Progress  Progressing  -     LTG 5  Patient will be able to perform step down 10 consecutive times from 6\" stair with good quad control and without holding.   -     LTG 5 Progress  Progressing  -     LTG 5 Progress Comments  can perform from 3\" stair only  -        Time Calculation    PT Goal Re-Cert Due Date  09/09/20  -       User Key  (r) = Recorded By, (t) = Taken By, (c) = Cosigned By    Initials Name Provider Type    Jennifer Chicas Physical Therapist               5 Times Sit to Stand  5 Times Sit to Stand (seconds): 8.51 seconds  5 Times Sit to Stand Comments: no ue support  Lower Extremity Functional Index  Any of your usual work, housework or school activities: No difficulty  Your usual hobbies, recreational or sporting activities: No difficulty  Getting into or out of the bath: No difficulty  Walking between rooms: No difficulty  Putting on your shoes or socks: No difficulty  Squatting: A little bit of difficulty  Lifting an object, like a bag of groceries from the floor: No difficulty  Performing light activities around your home: No difficulty  Performing heavy activities around your home: No difficulty  Getting into or out of a car: No difficulty  Walking 2 blocks: No difficulty  Walking a mile: No difficulty  Going up or down 10 stairs (about 1 flight of stairs): A little bit of difficulty  Standing for 1 hour: No difficulty  Sitting for 1 hour: No difficulty  Running on " even ground: Moderate difficulty  Running on uneven ground: A little bit of difficulty  Making sharp turns while running fast: A little bit of difficulty  Hopping: No difficulty  Rolling over in bed: No difficulty  Total: 74      Time Calculation:   Start Time: 1600  Stop Time: 1642  Time Calculation (min): 42 min  Therapy Charges for Today     Code Description Service Date Service Provider Modifiers Qty    91240893499 HC PT THER PROC EA 15 MIN 8/19/2020 Jennifer Ga GP 3          PT G-Codes  Total: 74         Jennifer Ga  8/19/2020

## 2020-08-24 ENCOUNTER — APPOINTMENT (OUTPATIENT)
Dept: PHYSICAL THERAPY | Facility: HOSPITAL | Age: 50
End: 2020-08-24

## 2020-08-26 ENCOUNTER — HOSPITAL ENCOUNTER (OUTPATIENT)
Dept: PHYSICAL THERAPY | Facility: HOSPITAL | Age: 50
Setting detail: THERAPIES SERIES
Discharge: HOME | End: 2020-08-26

## 2020-08-26 DIAGNOSIS — M25.562 ACUTE PAIN OF LEFT KNEE: Primary | ICD-10-CM

## 2020-08-26 PROCEDURE — 97110 THERAPEUTIC EXERCISES: CPT

## 2020-08-26 NOTE — THERAPY TREATMENT NOTE
Outpatient Physical Therapy Ortho Treatment Note  [facility]     Patient Name: Dominik Amaya  : 1970  MRN: [id number]  Today's Date: 2020      Visit Date: 2020     Subjective Improvement 75%  Visits 8/  Visits approved 2 additional visits from 2020 to 2020  RTMD PRN  Recert Date     Left knee Pain    Visit Dx:    ICD-10-CM ICD-9-CM   1. Acute pain of left knee M25.562 719.46       Patient Active Problem List   Diagnosis   • Coronary atherosclerosis of native coronary artery   • Tricuspid valve disorders, specified as nonrheumatic (aka 424.2)   • Dyspnea   • Essential hypertension        Past Medical History:   Diagnosis Date   • Coronary atherosclerosis of native coronary artery    • Dyspnea    • Hyperlipidemia    • Hypertension    • Tricuspid valve disorders, specified as nonrheumatic         Past Surgical History:   Procedure Laterality Date   • ANKLE SURGERY     • CARDIAC CATHETERIZATION                         PT Assessment/Plan     Row Name 20 1651          PT Assessment    Assessment Comments  Patient is compliant with current HEP.  No pain after ther ex.  Patient was encourage to join a fitness gym to cont with LE strength.  patient appeared concerned with covid 19 and indicated that she would rather wait until all that is over with.  -CP        PT Plan    PT Frequency  2x/week  -CP     Predicted Duration of Therapy Intervention (Therapy Eval)  3 weeks  -CP     PT Plan Comments  one more visit then D/c to home with updated HEP  -CP       User Key  (r) = Recorded By, (t) = Taken By, (c) = Cosigned By    Initials Name Provider Type    Clarisse Patterson PTA Physical Therapy Assistant            OP Exercises     Row Name 20 1600             Subjective Comments    Subjective Comments  Patient has decrease pain when walking.  she does report pain in left knee at times.  She doesnt really know what causes it.  -CP         Subjective Pain    Able to  "rate subjective pain?  yes  -CP      Pre-Treatment Pain Level  2 at times  -CP      Post-Treatment Pain Level  0  -CP         Exercise 1    Exercise Name 1  Pro II level 4  -CP      Time 1  10  -CP         Exercise 2    Exercise Name 2  supported hip ext   -CP      Cueing 2  Verbal;Demo  -CP      Sets 2  2  -CP      Reps 2  10  -CP         Exercise 3    Exercise Name 3  step downs 4\"  -CP      Cueing 3  Verbal;Demo  -CP      Sets 3  2  -CP      Reps 3  10  -CP         Exercise 4    Exercise Name 4  step up BOSU  -CP      Cueing 4  Verbal;Demo  -CP      Reps 4  20  -CP         Exercise 5    Exercise Name 5  wall slides with hip Ad squeezes  -CP      Cueing 5  Verbal  -CP      Sets 5  1  -CP      Reps 5  10  -CP      Time 5  10\" holds  -CP         Exercise 6    Exercise Name 6  slides circles  -CP      Reps 6  20 each  -CP      Time 6  CW/CCW  -CP         Exercise 7    Exercise Name 7  cybex hip AB  -CP      Cueing 7  Verbal;Tactile  -CP      Sets 7  3  -CP      Reps 7  10  -CP      Time 7  30 lb  -CP         Exercise 8    Exercise Name 8  cybex leg press  -CP      Cueing 8  Verbal;Tactile  -CP      Sets 8  3  -CP      Reps 8  10  -CP      Time 8  100 lb  -CP         Exercise 9    Exercise Name 9  cybex ham curls  -CP      Cueing 9  Verbal;Tactile  -CP      Sets 9  3  -CP      Reps 9  10  -CP      Time 9  40 lb  -CP         Exercise 10    Exercise Name 10  elipitcal  -CP      Cueing 10  Verbal  -CP      Time 10  5  -CP      Additional Comments  quick start  -CP        User Key  (r) = Recorded By, (t) = Taken By, (c) = Cosigned By    Initials Name Provider Type    Clarisse Patterson, PTA Physical Therapy Assistant                       PT OP Goals     Row Name 08/26/20 1600          PT Short Term Goals    STG Date to Achieve  08/19/20  -CP     STG 1  Patient will be independent with HEP.  -CP     STG 1 Progress  Met  -CP        Long Term Goals    LTG Date to Achieve  09/09/20  -CP     LTG 1  Patient will score 73 on " "LEFS.   -CP     LTG 1 Progress  Met  -CP     LTG 2  Patient will exhibit bilateral hip strength 5/5 all directions.   -CP     LTG 2 Progress  Progressing  -CP     LTG 3  Patient will perform sit to stand without UE support in 10\" or less.   -CP     LTG 3 Progress  Met  -CP     LTG 4  Patient will exhibit normal quad flexibility bilaterally.   -CP     LTG 4 Progress  Progressing  -CP     LTG 5  Patient will be able to perform step down 10 consecutive times from 6\" stair with good quad control and without holding.   -CP     LTG 5 Progress  Progressing  -CP        Time Calculation    PT Goal Re-Cert Due Date  09/09/20  -CP       User Key  (r) = Recorded By, (t) = Taken By, (c) = Cosigned By    Initials Name Provider Type    CP Clarisse Chapman PTA Physical Therapy Assistant                         Time Calculation:   Start Time: 1605  Stop Time: 1650  Time Calculation (min): 45 min  Total Timed Code Minutes- PT: 45 minute(s)  Therapy Charges for Today     Code Description Service Date Service Provider Modifiers Qty    59741770558 HC PT THER PROC EA 15 MIN 8/26/2020 Clarisse Chapman PTA GP 3                    Clarisse Chapman PTA  8/26/2020     "

## 2020-08-28 ENCOUNTER — HOSPITAL ENCOUNTER (OUTPATIENT)
Dept: PHYSICAL THERAPY | Facility: HOSPITAL | Age: 50
Setting detail: THERAPIES SERIES
Discharge: HOME | End: 2020-08-28

## 2020-08-28 DIAGNOSIS — M25.562 ACUTE PAIN OF LEFT KNEE: Primary | ICD-10-CM

## 2020-08-28 PROCEDURE — 97110 THERAPEUTIC EXERCISES: CPT

## 2020-08-28 NOTE — THERAPY TREATMENT NOTE
Outpatient Physical Therapy Ortho Treatment Note  [facility]     Patient Name: Dominik Amaya  : 1970  MRN: [id number]  Today's Date: 2020      Visit Date: 2020     Subjective Improvement 75%  Visits 9/10  Visits approved 2 additional visits form 2020 to 2020  RTMD patient will call  Recert Date 2020    Left knee pain    Visit Dx:    ICD-10-CM ICD-9-CM   1. Acute pain of left knee M25.562 719.46       Patient Active Problem List   Diagnosis   • Coronary atherosclerosis of native coronary artery   • Tricuspid valve disorders, specified as nonrheumatic (aka 424.2)   • Dyspnea   • Essential hypertension        Past Medical History:   Diagnosis Date   • Coronary atherosclerosis of native coronary artery    • Dyspnea    • Hyperlipidemia    • Hypertension    • Tricuspid valve disorders, specified as nonrheumatic         Past Surgical History:   Procedure Laterality Date   • ANKLE SURGERY     • CARDIAC CATHETERIZATION                         PT Assessment/Plan     Row Name 20 1203          PT Assessment    Assessment Comments  Increase pain today which maybe secondary to patient performing more ex at home.  No increase edema noted.  -CP        PT Plan    PT Plan Comments  Patient placed on hold until after she RTMD   -CP       User Key  (r) = Recorded By, (t) = Taken By, (c) = Cosigned By    Initials Name Provider Type    Clarisse Patterson, PTA Physical Therapy Assistant            OP Exercises     Row Name 20 1000             Subjective Comments    Subjective Comments  Patient reports increase pain today.  She called her MD and she is to go to the hospital to have xrays.  patient has joined [employer] and doing the program at home.  She is avoiding ex where she has to get on her knees and using pain as her guide.  -CP         Subjective Pain    Able to rate subjective pain?  yes  -CP      Pre-Treatment Pain Level  5  -CP      Post-Treatment Pain Level  5  -CP       "   Exercise 1    Exercise Name 1  Pro II leel 3  -CP         Exercise 2    Exercise Name 2  incline stretch  -CP      Cueing 2  Verbal;Demo  -CP      Sets 2  3  -CP      Time 2  30  -CP         Exercise 3    Exercise Name 3  Prone knee fl stretch with strap  -CP      Cueing 3  Verbal;Tactile  -CP      Sets 3  3  -CP      Time 3  30  -CP         Exercise 4    Exercise Name 4  Clamshells  -CP      Cueing 4  Verbal;Demo  -CP      Sets 4  2  -CP      Reps 4  10  -CP         Exercise 5    Exercise Name 5  SLR  -CP      Cueing 5  Verbal;Tactile  -CP      Sets 5  3  -CP      Reps 5  10  -CP      Time 5  3\" holds  -CP         Exercise 6    Exercise Name 6  SLR with ER  -CP      Cueing 6  Verbal;Tactile  -CP      Sets 6  2  -CP      Reps 6  10  -CP         Exercise 7    Exercise Name 7  bridign with tband  -CP      Cueing 7  Verbal;Tactile  -CP      Sets 7  2  -CP      Reps 7  10  -CP      Time 7  blue  -CP        User Key  (r) = Recorded By, (t) = Taken By, (c) = Cosigned By    Initials Name Provider Type    CP Clarisse Chapman, PTA Physical Therapy Assistant                       PT OP Goals     Row Name 08/28/20 1200          PT Short Term Goals    STG Date to Achieve  08/19/20  -CP     STG 1  Patient will be independent with HEP.  -CP     STG 1 Progress  Met  -CP        Long Term Goals    LTG Date to Achieve  09/09/20  -CP     LTG 1  Patient will score 73 on LEFS.   -CP     LTG 1 Progress  Met  -CP     LTG 2  Patient will exhibit bilateral hip strength 5/5 all directions.   -CP     LTG 2 Progress  Progressing  -CP     LTG 3  Patient will perform sit to stand without UE support in 10\" or less.   -CP     LTG 3 Progress  Met  -CP     LTG 4  Patient will exhibit normal quad flexibility bilaterally.   -CP     LTG 4 Progress  Progressing  -CP     LTG 5  Patient will be able to perform step down 10 consecutive times from 6\" stair with good quad control and without holding.   -CP     LTG 5 Progress  Progressing  -CP        Time " Calculation    PT Goal Re-Cert Due Date  09/09/20  -CP       User Key  (r) = Recorded By, (t) = Taken By, (c) = Cosigned By    Initials Name Provider Type    CP Clarisse Chapman, LIZBETH Physical Therapy Assistant          Therapy Education  Education Details: DINORA robert, TARYNR with ER , bridging with tband  Given: HEP  Program: New  How Provided: Verbal, Demonstration, Written  Provided to: Patient  Level of Understanding: Teach back education performed, Verbalized, Demonstrated              Time Calculation:   Start Time: 1022  Stop Time: 1101  Time Calculation (min): 39 min  Total Timed Code Minutes- PT: 39 minute(s)  Therapy Charges for Today     Code Description Service Date Service Provider Modifiers Qty    17047967559 HC PT THER PROC EA 15 MIN 8/28/2020 Clarisse Chapman, LIZBETH GP 3                    Clarisse Chapman PTA  8/28/2020

## 2020-08-31 ENCOUNTER — OFFICE VISIT (OUTPATIENT)
Dept: OBSTETRICS AND GYNECOLOGY | Facility: CLINIC | Age: 50
End: 2020-08-31

## 2020-08-31 ENCOUNTER — APPOINTMENT (OUTPATIENT)
Dept: PHYSICAL THERAPY | Facility: HOSPITAL | Age: 50
End: 2020-08-31

## 2020-08-31 VITALS — DIASTOLIC BLOOD PRESSURE: 76 MMHG | WEIGHT: 180 LBS | BODY MASS INDEX: 30.9 KG/M2 | SYSTOLIC BLOOD PRESSURE: 130 MMHG

## 2020-08-31 DIAGNOSIS — N95.0 POSTMENOPAUSAL BLEEDING: Primary | ICD-10-CM

## 2020-08-31 PROCEDURE — 99204 OFFICE O/P NEW MOD 45 MIN: CPT | Performed by: OBSTETRICS & GYNECOLOGY

## 2020-09-03 ENCOUNTER — APPOINTMENT (OUTPATIENT)
Dept: PHYSICAL THERAPY | Facility: HOSPITAL | Age: 50
End: 2020-09-03

## 2022-04-12 ENCOUNTER — TELEPHONE (OUTPATIENT)
Dept: CARDIOLOGY | Facility: CLINIC | Age: 52
End: 2022-04-12

## 2022-04-12 NOTE — TELEPHONE ENCOUNTER
Returned call to Dr. Almendarez, informed patient that we haven't seen her since 2019. He asked what medications she was on in 2019, informed him coreg, Lipitor, norvasc, plavix but that was in 2019 and has not been seen since then. He voiced his understanding.

## 2022-04-12 NOTE — TELEPHONE ENCOUNTER
----- Message from Sindi Durham sent at 4/12/2022  3:42 PM CDT -----  Contact: [phone]  Dr. Amlendarez a dentist from [facility] left a voicemail about Dominik Amaya and was needing a return call back at [phone], he is also faxing over a form for Dr. Baez to fill out and return. Thanks.

## 2022-04-21 ENCOUNTER — TELEPHONE (OUTPATIENT)
Dept: CARDIOLOGY | Facility: CLINIC | Age: 52
End: 2022-04-21

## 2022-04-21 NOTE — TELEPHONE ENCOUNTER
Contacted patient and informed her that Dr. Baez has not seen pt since 2019 and she will need an appointment to be seen and this can be addressed at the appointment. Patient asked to be added to the wait list in case of a cancellation. She was added to wait list and she voiced her understanding.

## 2022-04-21 NOTE — TELEPHONE ENCOUNTER
----- Message from Sindi uDrham sent at 2022  3:12 PM CDT -----  Dominik Amaya : 1970 called stating that she is trying to get a job at [employer] and they are needing a letter from Dr. 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 Her return number is [phone].     [employer] [facility]'s phone number is [phone] and fax number [phone].     Thanks.

## 2022-05-27 ENCOUNTER — OFFICE VISIT (OUTPATIENT)
Dept: CARDIOLOGY | Facility: CLINIC | Age: 52
End: 2022-05-27

## 2022-05-27 VITALS
BODY MASS INDEX: 35.58 KG/M2 | HEIGHT: 64 IN | WEIGHT: 208.4 LBS | HEART RATE: 67 BPM | DIASTOLIC BLOOD PRESSURE: 78 MMHG | SYSTOLIC BLOOD PRESSURE: 128 MMHG | OXYGEN SATURATION: 99 %

## 2022-05-27 DIAGNOSIS — I25.10 ATHEROSCLEROSIS OF NATIVE CORONARY ARTERY OF NATIVE HEART WITHOUT ANGINA PECTORIS: ICD-10-CM

## 2022-05-27 DIAGNOSIS — I10 ESSENTIAL HYPERTENSION: ICD-10-CM

## 2022-05-27 DIAGNOSIS — I34.0 NONRHEUMATIC MITRAL VALVE REGURGITATION: ICD-10-CM

## 2022-05-27 DIAGNOSIS — I10 HYPERTENSION, UNSPECIFIED TYPE: Primary | ICD-10-CM

## 2022-05-27 PROCEDURE — 99214 OFFICE O/P EST MOD 30 MIN: CPT | Performed by: INTERNAL MEDICINE

## 2022-05-27 PROCEDURE — 93000 ELECTROCARDIOGRAM COMPLETE: CPT | Performed by: INTERNAL MEDICINE

## 2022-05-27 RX ORDER — ATORVASTATIN CALCIUM 20 MG/1
20 TABLET, FILM COATED ORAL DAILY
Qty: 90 TABLET | Refills: 2 | Status: SHIPPED | OUTPATIENT
Start: 2022-05-27 | End: 2022-09-02 | Stop reason: SDUPTHER

## 2022-05-27 RX ORDER — CLOPIDOGREL BISULFATE 75 MG/1
75 TABLET ORAL DAILY
Qty: 90 TABLET | Refills: 2 | Status: SHIPPED | OUTPATIENT
Start: 2022-05-27 | End: 2022-09-02 | Stop reason: SDUPTHER

## 2022-05-27 RX ORDER — CARVEDILOL 6.25 MG/1
6.25 TABLET ORAL 2 TIMES DAILY
Qty: 180 TABLET | Refills: 3 | Status: SHIPPED | OUTPATIENT
Start: 2022-05-27 | End: 2022-09-02 | Stop reason: SDUPTHER

## 2022-05-27 NOTE — PROGRESS NOTES
Dominik Amaya  51 y.o. female    05/27/2022  1. Hypertension, unspecified type    2. Atherosclerosis of native coronary artery of native heart without angina pectoris    3. 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 Essential hypertension        History of Present Illness:    Body mass index is 35.77 kg/m². BMI is above normal parameters. Recommendations include: exercise counseling, nutrition counseling and referral to primary care.        51 years old patient last was evaluated in 2019 no follow-up she ran out of her medication she is not taking she presented to reestablish her medical care with a background history of hypertension, hyperlipidemia, CAD s/p PCI and stent to diagonal with preserved left ventricle systolic function mild mitral regurgitation.  No symptom of cardiac decompensation such orthopnea PND chest pain lightheaded dizziness reported.  Last echo was 2019 reported normal ventricular systolic function with grade 1 relaxation abnormality and mild mitral regurgitations.  Clinically there is no progression         Echo November 2019    · Estimated EF = 63%.  · Left ventricular systolic function is normal.  · Left ventricular diastolic dysfunction (grade I) consistent with impaired relaxation.  · Mild mitral valve regurgitation is present      Stress test 2/2019  Patient exercised according to Fan protocol for 10 minutes and 11 seconds with METs achieved 13.4.  This represents  good functional capacity.  The heart rate increased from 80  to 176 bpm reflects  100% of age meditated heart rate.  Patient had hypertensive blood pressure response.  Test was stopped due to target heart rate achieved and fatigability.  No ST-T wave changes suggesting ischemia.  No arrhythmia noted.     V. Dopplar  1/2018  · Normal left lower extremity venous duplex scan.  · Negative for venous reflux.  · Negative for DVT.        SUBJECTIVE:    Allergies   Allergen Reactions   • Acetaminophen     • Hydrocodone Bitartrate Er    • Oxycodone          Past Medical History:   Diagnosis Date   • Abnormal Pap smear of cervix    • Coronary atherosclerosis of native coronary artery    • Dyspnea    • Gonorrhea    • History of transfusion    • Hyperlipidemia    • Hypertension    • Myocardial infarction (HCC)    • Tricuspid valve disorders, specified as nonrheumatic    • Urogenital trichomoniasis          Past Surgical History:   Procedure Laterality Date   • ANKLE SURGERY     • CARDIAC CATHETERIZATION     • CERVIX LESION DESTRUCTION           Family History   Problem Relation Age of Onset   • Diabetes Mother    • Hypertension Mother    • Liver disease Father    • Hypertension Father    • Diabetes Father    • Breast cancer Sister    • Diabetes Sister    • Leukemia Sister          Social History     Socioeconomic History   • Marital status:    Tobacco Use   • Smoking status: Former Smoker   • Smokeless tobacco: Never Used   Substance and Sexual Activity   • Alcohol use: Yes     Comment: socially   • Drug use: No   • Sexual activity: Defer         Current Outpatient Medications   Medication Sig Dispense Refill   • amLODIPine (NORVASC) 5 MG tablet Take 1 tablet by mouth Daily. 90 tablet 2   • atorvastatin (LIPITOR) 20 MG tablet Take 1 tablet by mouth Daily. 90 tablet 2   • calcium-vitamin D (OSCAL) 250-125 MG-UNIT per tablet Take  by mouth.     • carvedilol (COREG) 6.25 MG tablet Take 1 tablet by mouth 2 (Two) Times a Day. 180 tablet 3   • Cholecalciferol 1.25 MG (95711 UT) tablet Take 1 tablet by mouth.     • clopidogrel (PLAVIX) 75 MG tablet Take 1 tablet by mouth Daily. 90 tablet 2   • ferrous sulfate 324 (65 FE) MG tablet delayed-release EC tablet Take 324 mg by mouth Daily With Breakfast.     • meloxicam (MOBIC) 15 MG tablet TAKE 1 TABLET BY MOUTH DAILY. 30 tablet 1   • mupirocin 2 % ointment Apply  topically to the appropriate area as directed.       No current facility-administered medications for this  "visit.           Review of Systems:     Constitutional:  Denies recent weight loss, weight gain,no change in exercise tolerance.     HENT:  Denies any hearing loss, epistaxis    Eyes: No blurring    Respiratory: History of COPD    Cardiovascular: See H&P    Gastrointestinal:  Denies change in bowel habits and dyspepsia    Endocrine: Negative for cold intolerance, heat intolerance, polydipsia    Genitourinary: Negative.      Musculoskeletal: History of osteoarthritis    Skin:  Deniesrashes, or skin lesions.     Allergic/Immunologic: Negative.  Negative for environmental allergies    Neurological:  Denies any history of recurrent headaches, strokes,     Hematological: Denies any food allergies, seasonal allergies    Psychiatric/Behavioral: Denies any history of depression        OBJECTIVE:    /78 (BP Location: Left arm, Patient Position: Sitting, Cuff Size: Adult)   Pulse 67   Ht 162.6 cm (64\")   Wt 94.5 kg (208 lb 6.4 oz)   SpO2 99%   BMI 35.77 kg/m²     Physical Exam:     Constitutional: Cooperative, alert and oriented, well-developed, well-nourished, in no acute distress.     HENT:   Head: Normocephalic, conjunctive is a pink, thyroid is nonpalpable no carotid bruit and trachea central.     Cardiovascular: Regular rhythm, S1 and S2 normal, no S3 or S4. Apical impulse not displaced. No murmurs    Pulmonary/Chest: Chest: No chest wall tenderness no rales and wheezing    Abdominal: Abdomen soft, bowel sounds normoactive, no masses,    Musculoskeletal: No deformities, clubbing, cyanosis, erythema. positive mild edema  Neurological: No gross motor or sensory deficits noted    Skin: Warm and dry to the touch, no apparent skin lesions .     Psychiatric: He has a normal mood and affect. His behavior is normal        Procedures      Lab Results   Component Value Date    WBC 5.7 02/26/2019    HGB 11.1 (L) 02/26/2019    HCT 34.9 (L) 02/26/2019    MCV 92 02/26/2019     02/26/2019     Lab Results   Component " Value Date    GLUCOSE 92 10/27/2014    BUN 13 02/26/2019    CREATININE 0.7 02/26/2019    CO2 24 10/27/2014    CALCIUM 8.9 02/26/2019    ALBUMIN 4.50 11/22/2019    AST 12 11/22/2019    ALT 10 11/22/2019     Lab Results   Component Value Date    CHOL 177 08/28/2020    CHOL 155 11/22/2019    CHOL 146 02/26/2019     Lab Results   Component Value Date    TRIG 53 08/28/2020    TRIG 78 11/22/2019    TRIG 47 02/26/2019     Lab Results   Component Value Date    HDL 78 08/28/2020    HDL 67 (H) 11/22/2019    HDL 68 02/26/2019     No components found for: LDLCALC  Lab Results   Component Value Date    LDL 82 08/28/2020    LDL 72 11/22/2019    LDL 63 02/26/2019     No results found for: HDLLDLRATIO  No components found for: CHOLHDL  Lab Results   Component Value Date    HGBA1C 5.70 (H) 11/22/2019     Lab Results   Component Value Date    TSH 2.19 10/27/2014           ASSESSMENT AND PLAN:  #1 CAD status post PTCA stent of diagonal branch    Patient ran out of her medication Plavix and carvedilol was refilled a prescription sent to the pharmacy       #2 hypertension with grade 1 diastolic dysfunction    She has good blood pressure will continue 6.25 mg twice       #3 hyperlipidemia  Around her medication we will continue atorvastatin 20 mg    #4 mitral regurgitation    Clinical there is no progression  No surgically intervention indicated    Preventive    Smoking Future risk of continued smoking discussed with the patient given history of PCI and stent    Obesity with a BMI of 35.7.    Significant low-carb weight, low-fat, DASH diet graded exercise discussed with the patient    I spent 25 minutes caring for Dominik on this date of service. This time includes time spent by me of counseling/coordination of care as relates to the presenting problem and any ordered procedures/tests as outlined above.           This document has been electronically signed by Shan Baez MD on May 27, 2022 11:24 CDT        Diagnoses and all orders  for this visit:    1. Hypertension, unspecified type (Primary)  -     ECG 12 Lead    2. Atherosclerosis of native coronary artery of native heart without angina pectoris    3. 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 Essential hypertension          Shan Baez MD  5/27/2022  11:08 CDT

## 2022-05-31 LAB
QT INTERVAL: 402 MS
QTC INTERVAL: 424 MS

## 2022-06-27 ENCOUNTER — TELEPHONE (OUTPATIENT)
Dept: CARDIOLOGY | Facility: CLINIC | Age: 52
End: 2022-06-27

## 2022-06-27 NOTE — TELEPHONE ENCOUNTER
returned patient call and informed her that per Dr. Baez she can take the job but if her work needed in writing to please fax over to [phone] and Dr. Baez would complete. She voiced her understanding.     ----- Message from Shan Baez MD sent at 6/27/2022 11:02 AM CDT -----  Contact: [phone]  Can take job no issues   ----- Message -----  From: Epley, [place], MA  Sent: 6/27/2022   9:10 AM CDT  To: Shan Baez MD      ----- Message -----  From: Sarah Jimenez RegSched Rep  Sent: 6/27/2022   8:54 AM CDT  To: Kendall Epley, MA    Dominik Amaya called and left a voicemail about a message she had left about if she can take this new job or not? No other details left. Return number is [phone]. Thanks.

## 2022-08-25 ENCOUNTER — TELEPHONE (OUTPATIENT)
Dept: CARDIOLOGY | Facility: CLINIC | Age: 52
End: 2022-08-25

## 2022-08-25 NOTE — TELEPHONE ENCOUNTER
Patient contacted office in regards to a work clearance that she mailed 3 weeks ago. Informed her that we had not received the clearance and advised her to fax this to [phone]. She voiced her understanding.

## 2022-09-02 ENCOUNTER — TELEPHONE (OUTPATIENT)
Dept: CARDIOLOGY | Facility: CLINIC | Age: 52
End: 2022-09-02

## 2022-09-02 RX ORDER — CARVEDILOL 6.25 MG/1
6.25 TABLET ORAL 2 TIMES DAILY
Qty: 180 TABLET | Refills: 3 | Status: SHIPPED | OUTPATIENT
Start: 2022-09-02

## 2022-09-02 RX ORDER — ATORVASTATIN CALCIUM 20 MG/1
20 TABLET, FILM COATED ORAL DAILY
Qty: 90 TABLET | Refills: 3 | Status: SHIPPED | OUTPATIENT
Start: 2022-09-02

## 2022-09-02 RX ORDER — CLOPIDOGREL BISULFATE 75 MG/1
75 TABLET ORAL DAILY
Qty: 90 TABLET | Refills: 3 | Status: SHIPPED | OUTPATIENT
Start: 2022-09-02

## 2022-09-02 NOTE — TELEPHONE ENCOUNTER
----- Message from Sindi Ravi sent at 9/2/2022  8:46 AM CDT -----  Regarding: refill  Dominik Amaya is needing a refill of carvedilol 6.25MG, clopidogrel 75MG, and atorvastatin 20MG sent to [facility] in [place]. Thanks

## 2022-11-08 ENCOUNTER — OFFICE VISIT (OUTPATIENT)
Dept: CARDIOLOGY | Facility: CLINIC | Age: 52
End: 2022-11-08

## 2022-11-08 VITALS
HEIGHT: 64 IN | HEART RATE: 78 BPM | OXYGEN SATURATION: 99 % | SYSTOLIC BLOOD PRESSURE: 132 MMHG | BODY MASS INDEX: 34.15 KG/M2 | WEIGHT: 200 LBS | DIASTOLIC BLOOD PRESSURE: 80 MMHG

## 2022-11-08 DIAGNOSIS — I10 ESSENTIAL HYPERTENSION: ICD-10-CM

## 2022-11-08 DIAGNOSIS — I25.10 ATHEROSCLEROSIS OF NATIVE CORONARY ARTERY OF NATIVE HEART WITHOUT ANGINA PECTORIS: Primary | ICD-10-CM

## 2022-11-08 DIAGNOSIS — I34.0 NONRHEUMATIC MITRAL VALVE REGURGITATION: ICD-10-CM

## 2022-11-08 PROCEDURE — 99214 OFFICE O/P EST MOD 30 MIN: CPT | Performed by: INTERNAL MEDICINE

## 2022-11-08 NOTE — PROGRESS NOTES
Dominik Amaya  52 y.o. female    11/8/2022  1. Atherosclerosis of native coronary artery of native heart without angina pectoris    2. Nonrheumatic mitral valve regurgitation    3. Essential hypertension        History of Present Illness:    Body mass index is 34.33 kg/m². BMI is above normal parameters. Recommendations include: exercise counseling, nutrition counseling and referral to primary care.      52 years old patient presented today dated 11/8/2022 for routine follow-up with concurrent medical problem of CAD s/p PCI and stent to diagonal, hypertension hyperlipidemia with mild tricuspid regurgitation and preserved left ventricle systolic function.  No symptoms of cardiac decompensation such orthopnea PND chest pain lightheaded dizziness reported.  She is a pleased with her clinical outcome.  She is compliant to the medical management.  Previous lipid have good LDL           Echo November 2019    · Estimated EF = 63%.  · Left ventricular systolic function is normal.  · Left ventricular diastolic dysfunction (grade I) consistent with impaired relaxation.  · Mild mitral valve regurgitation is present      Stress test 2/2019  Patient exercised according to Fan protocol for 10 minutes and 11 seconds with METs achieved 13.4.  This represents  good functional capacity.  The heart rate increased from 80  to 176 bpm reflects  100% of age meditated heart rate.  Patient had hypertensive blood pressure response.  Test was stopped due to target heart rate achieved and fatigability.  No ST-T wave changes suggesting ischemia.  No arrhythmia noted.     V. Dopplar  1/2018  · Normal left lower extremity venous duplex scan.  · Negative for venous reflux.  · Negative for DVT.        SUBJECTIVE:    Allergies   Allergen Reactions   • Acetaminophen    • Hydrocodone Bitartrate Er    • Oxycodone          Past Medical History:   Diagnosis Date   • Abnormal Pap smear of cervix    • Coronary atherosclerosis of native coronary artery     • Dyspnea    • Gonorrhea    • History of transfusion    • Hyperlipidemia    • Hypertension    • Myocardial infarction (HCC)    • Tricuspid valve disorders, specified as nonrheumatic    • Urogenital trichomoniasis          Past Surgical History:   Procedure Laterality Date   • ANKLE SURGERY     • CARDIAC CATHETERIZATION     • CERVIX LESION DESTRUCTION           Family History   Problem Relation Age of Onset   • Diabetes Mother    • Hypertension Mother    • Liver disease Father    • Hypertension Father    • Diabetes Father    • Breast cancer Sister    • Diabetes Sister    • Leukemia Sister          Social History     Socioeconomic History   • Marital status:    Tobacco Use   • Smoking status: Former   • Smokeless tobacco: Never   Substance and Sexual Activity   • Alcohol use: Yes     Comment: socially   • Drug use: No   • Sexual activity: Defer         Current Outpatient Medications   Medication Sig Dispense Refill   • atorvastatin (LIPITOR) 20 MG tablet Take 1 tablet by mouth Daily. 90 tablet 3   • calcium-vitamin D (OSCAL) 250-125 MG-UNIT per tablet Take  by mouth.     • carvedilol (COREG) 6.25 MG tablet Take 1 tablet by mouth 2 (Two) Times a Day. 180 tablet 3   • Cholecalciferol 1.25 MG (36622 UT) tablet Take 1 tablet by mouth.     • clopidogrel (PLAVIX) 75 MG tablet Take 1 tablet by mouth Daily. 90 tablet 3   • ferrous sulfate 324 (65 FE) MG tablet delayed-release EC tablet Take 324 mg by mouth Daily With Breakfast.     • meloxicam (MOBIC) 15 MG tablet TAKE 1 TABLET BY MOUTH DAILY. 30 tablet 1   • mupirocin 2 % ointment Apply  topically to the appropriate area as directed.       No current facility-administered medications for this visit.           Review of Systems:     Today dated 11/8/2022 no significant change was noted in the review of system  Constitutional:  Denies recent weight loss, weight gain,no change in exercise tolerance.     HENT:  Denies any hearing loss, epistaxis    Eyes: No  "blurring    Respiratory: No history of COPD    Cardiovascular: See H&P    Gastrointestinal:  Denies change in bowel habits and dyspepsia    Endocrine: Negative for cold intolerance, heat intolerance, polydipsia    Genitourinary: Negative.      Musculoskeletal: History of osteoarthritis    Skin:  Deniesrashes, or skin lesions.     Allergic/Immunologic: Negative.  Negative for environmental allergies    Neurological:  Denies any history of recurrent headaches, strokes,     Hematological: Denies any food allergies, seasonal allergies    Psychiatric/Behavioral: Denies any history of depression        OBJECTIVE:    /80 (BP Location: Left arm, Patient Position: Sitting, Cuff Size: Adult)   Pulse 78   Ht 162.6 cm (64\")   Wt 90.7 kg (200 lb)   SpO2 99%   BMI 34.33 kg/m²     Physical Exam:   No significant change was noted in physical exam compared to previous  Constitutional: Cooperative, alert and oriented, well-developed, well-nourished, in no acute distress.     HENT:   Head: Normocephalic, conjunctive is a pink, thyroid is nonpalpable no carotid bruit and trachea central.     Cardiovascular: Regular rhythm, S1 and S2 normal, no S3 or S4. Apical impulse not displaced. No murmurs    Pulmonary/Chest: Chest: No chest wall tenderness no rales and wheezing    Abdominal: Abdomen soft, bowel sounds normoactive, no masses,    Musculoskeletal: No deformities, clubbing, cyanosis, erythema. positive mild edema  Neurological: No gross motor or sensory deficits noted    Skin: Warm and dry to the touch, no apparent skin lesions .     Psychiatric: He has a normal mood and affect. His behavior is normal        Procedures      Lab Results   Component Value Date    WBC 5.7 02/26/2019    HGB 11.1 (L) 02/26/2019    HCT 34.9 (L) 02/26/2019    MCV 92 02/26/2019     02/26/2019     Lab Results   Component Value Date    GLUCOSE 92 10/27/2014    BUN 13 02/26/2019    CREATININE 0.7 02/26/2019    CO2 24 10/27/2014    CALCIUM 8.9 " 02/26/2019    ALBUMIN 4.50 11/22/2019    AST 12 11/22/2019    ALT 10 11/22/2019     Lab Results   Component Value Date    CHOL 177 08/28/2020    CHOL 155 11/22/2019    CHOL 146 02/26/2019     Lab Results   Component Value Date    TRIG 53 08/28/2020    TRIG 78 11/22/2019    TRIG 47 02/26/2019     Lab Results   Component Value Date    HDL 78 08/28/2020    HDL 67 (H) 11/22/2019    HDL 68 02/26/2019     No components found for: LDLCALC  Lab Results   Component Value Date    LDL 82 08/28/2020    LDL 72 11/22/2019    LDL 63 02/26/2019     No results found for: HDLLDLRATIO  No components found for: CHOLHDL  Lab Results   Component Value Date    HGBA1C 5.70 (H) 11/22/2019     Lab Results   Component Value Date    TSH 2.19 10/27/2014           ASSESSMENT AND PLAN:  #1 CAD status post PTCA stent of diagonal branch    Asymptomatic at the time of evaluation continue statin continue Plavix       #2 hypertension with grade 1 diastolic dysfunction    She has good blood pressure will continue 6.25 mg twice       #3 hyperlipidemia  Around her medication we will continue atorvastatin 20 mg    #4 mitral regurgitation  Clinically there is no progression at the time of evaluation we will continue clinical surveillance    Preventive        Class II obesity with a BMI of 34.33    Significant low-carb weight, low-fat, DASH diet graded exercise discussed with the patient    I spent 16 minutes caring for Dominik on this date of service. This time includes time spent by me of counseling/coordination of care as relates to the presenting problem and any ordered procedures/tests as outlined above.           This document has been electronically signed by Shan Baez MD on November 8, 2022 10:50 CST        Diagnoses and all orders for this visit:    1. Atherosclerosis of native coronary artery of native heart without angina pectoris (Primary)    2. Nonrheumatic mitral valve regurgitation    3. Essential hypertension          Shan Baez  MD  11/8/2022  10:50 CST

## 2023-08-18 ENCOUNTER — APPOINTMENT (OUTPATIENT)
Dept: CT IMAGING | Facility: HOSPITAL | Age: 53
End: 2023-08-18
Payer: COMMERCIAL

## 2023-08-18 ENCOUNTER — HOSPITAL ENCOUNTER (EMERGENCY)
Facility: HOSPITAL | Age: 53
Discharge: HOME | End: 2023-08-18
Attending: EMERGENCY MEDICINE
Payer: COMMERCIAL

## 2023-08-18 VITALS
TEMPERATURE: 98.8 F | RESPIRATION RATE: 20 BRPM | HEART RATE: 75 BPM | OXYGEN SATURATION: 98 % | DIASTOLIC BLOOD PRESSURE: 100 MMHG | SYSTOLIC BLOOD PRESSURE: 184 MMHG

## 2023-08-18 DIAGNOSIS — R10.13 EPIGASTRIC PAIN: ICD-10-CM
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
## 2023-08-18 DIAGNOSIS — I10 HYPERTENSION, UNSPECIFIED TYPE: ICD-10-CM

## 2023-08-18 DIAGNOSIS — M54.6 ACUTE THORACIC BACK PAIN, UNSPECIFIED BACK PAIN LATERALITY: ICD-10-CM

## 2023-08-18 LAB
ALBUMIN SERPL-MCNC: 4.4 G/DL (ref 3.5–5.2)
ALBUMIN/GLOB SERPL: 1.2 G/DL
ALP SERPL-CCNC: 83 U/L (ref 39–117)
ALT SERPL W P-5'-P-CCNC: 13 U/L (ref 1–33)
ANION GAP SERPL CALCULATED.3IONS-SCNC: 10 MMOL/L (ref 5–15)
AST SERPL-CCNC: 11 U/L (ref 1–32)
BASOPHILS # BLD AUTO: 0.07 10*3/MM3 (ref 0–0.2)
BASOPHILS NFR BLD AUTO: 1.1 % (ref 0–1.5)
BILIRUB SERPL-MCNC: 0.3 MG/DL (ref 0–1.2)
BUN SERPL-MCNC: 13 MG/DL (ref 6–20)
BUN/CREAT SERPL: 16.5 (ref 7–25)
CALCIUM SPEC-SCNC: 9.7 MG/DL (ref 8.6–10.5)
CHLORIDE SERPL-SCNC: 101 MMOL/L (ref 98–107)
CO2 SERPL-SCNC: 29 MMOL/L (ref 22–29)
CREAT SERPL-MCNC: 0.79 MG/DL (ref 0.57–1)
DEPRECATED RDW RBC AUTO: 42 FL (ref 37–54)
EGFRCR SERPLBLD CKD-EPI 2021: 89.6 ML/MIN/1.73
EOSINOPHIL # BLD AUTO: 0.12 10*3/MM3 (ref 0–0.4)
EOSINOPHIL NFR BLD AUTO: 1.9 % (ref 0.3–6.2)
ERYTHROCYTE [DISTWIDTH] IN BLOOD BY AUTOMATED COUNT: 13.1 % (ref 12.3–15.4)
GLOBULIN UR ELPH-MCNC: 3.6 GM/DL
GLUCOSE SERPL-MCNC: 170 MG/DL (ref 65–99)
HCT VFR BLD AUTO: 35.8 % (ref 34–46.6)
HGB BLD-MCNC: 12 G/DL (ref 12–15.9)
HOLD SPECIMEN: NORMAL
IMM GRANULOCYTES # BLD AUTO: 0.02 10*3/MM3 (ref 0–0.05)
IMM GRANULOCYTES NFR BLD AUTO: 0.3 % (ref 0–0.5)
LYMPHOCYTES # BLD AUTO: 2.68 10*3/MM3 (ref 0.7–3.1)
LYMPHOCYTES NFR BLD AUTO: 41.9 % (ref 19.6–45.3)
MCH RBC QN AUTO: 29.5 PG (ref 26.6–33)
MCHC RBC AUTO-ENTMCNC: 33.5 G/DL (ref 31.5–35.7)
MCV RBC AUTO: 88 FL (ref 79–97)
MONOCYTES # BLD AUTO: 0.46 10*3/MM3 (ref 0.1–0.9)
MONOCYTES NFR BLD AUTO: 7.2 % (ref 5–12)
NEUTROPHILS NFR BLD AUTO: 3.04 10*3/MM3 (ref 1.7–7)
NEUTROPHILS NFR BLD AUTO: 47.6 % (ref 42.7–76)
NRBC BLD AUTO-RTO: 0 /100 WBC (ref 0–0.2)
PLATELET # BLD AUTO: 204 10*3/MM3 (ref 140–450)
PMV BLD AUTO: 10.8 FL (ref 6–12)
POTASSIUM SERPL-SCNC: 3.6 MMOL/L (ref 3.5–5.2)
PROT SERPL-MCNC: 8 G/DL (ref 6–8.5)
RBC # BLD AUTO: 4.07 10*6/MM3 (ref 3.77–5.28)
SODIUM SERPL-SCNC: 140 MMOL/L (ref 136–145)
WBC NRBC COR # BLD: 6.39 10*3/MM3 (ref 3.4–10.8)
WHOLE BLOOD HOLD COAG: NORMAL

## 2023-08-18 PROCEDURE — 74177 CT ABD & PELVIS W/CONTRAST: CPT

## 2023-08-18 PROCEDURE — 85025 COMPLETE CBC W/AUTO DIFF WBC: CPT | Performed by: EMERGENCY MEDICINE

## 2023-08-18 PROCEDURE — 99285 EMERGENCY DEPT VISIT HI MDM: CPT

## 2023-08-18 PROCEDURE — 71260 CT THORAX DX C+: CPT

## 2023-08-18 PROCEDURE — 72128 CT CHEST SPINE W/O DYE: CPT

## 2023-08-18 PROCEDURE — 80053 COMPREHEN METABOLIC PANEL: CPT | Performed by: EMERGENCY MEDICINE

## 2023-08-18 PROCEDURE — 72131 CT LUMBAR SPINE W/O DYE: CPT

## 2023-08-18 PROCEDURE — 25510000001 IOPAMIDOL 61 % SOLUTION: Performed by: EMERGENCY MEDICINE

## 2023-08-18 RX ORDER — SODIUM CHLORIDE 0.9 % (FLUSH) 0.9 %
10 SYRINGE (ML) INJECTION AS NEEDED
Status: DISCONTINUED | OUTPATIENT
Start: 2023-08-18 | End: 2023-08-19 | Stop reason: HOSPADM

## 2023-08-18 RX ORDER — CLONIDINE HYDROCHLORIDE 0.2 MG/1
0.2 TABLET ORAL ONCE
Status: COMPLETED | OUTPATIENT
Start: 2023-08-18 | End: 2023-08-18

## 2023-08-18 RX ORDER — CYCLOBENZAPRINE HCL 10 MG
10 TABLET ORAL 3 TIMES DAILY PRN
Qty: 15 TABLET | Refills: 0 | Status: SHIPPED | OUTPATIENT
Start: 2023-08-18

## 2023-08-18 RX ORDER — ORPHENADRINE CITRATE 100 MG/1
100 TABLET, EXTENDED RELEASE ORAL ONCE
Status: COMPLETED | OUTPATIENT
Start: 2023-08-18 | End: 2023-08-18

## 2023-08-18 RX ORDER — LIDOCAINE 50 MG/G
2 PATCH TOPICAL ONCE
Status: DISCONTINUED | OUTPATIENT
Start: 2023-08-18 | End: 2023-08-19 | Stop reason: HOSPADM

## 2023-08-18 RX ADMIN — CLONIDINE HYDROCHLORIDE 0.2 MG: 0.2 TABLET ORAL at 21:22

## 2023-08-18 RX ADMIN — LIDOCAINE 2 PATCH: 50 PATCH CUTANEOUS at 20:03

## 2023-08-18 RX ADMIN — IOPAMIDOL 90 ML: 612 INJECTION, SOLUTION INTRAVENOUS at 20:51

## 2023-08-18 RX ADMIN — ORPHENADRINE CITRATE 100 MG: 100 TABLET, EXTENDED RELEASE ORAL at 20:04
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
## 2023-08-19 NOTE — DISCHARGE INSTRUCTIONS
Please return for new or worsening symptoms.  Muscle relaxer has been sent to the pharmacy to help with musculoskeletal discomfort over the next few days.  Follow closely with primary care regarding your hypertension and restarting medications.

## 2023-08-23 ENCOUNTER — TRANSCRIBE ORDERS (OUTPATIENT)
Dept: LAB | Facility: HOSPITAL | Age: 53
End: 2023-08-23
Payer: COMMERCIAL

## 2023-08-23 ENCOUNTER — LAB (OUTPATIENT)
Dept: LAB | Facility: HOSPITAL | Age: 53
End: 2023-08-23
Payer: COMMERCIAL

## 2023-08-23 DIAGNOSIS — H66.90 ACUTE OTITIS MEDIA, UNSPECIFIED OTITIS MEDIA TYPE: ICD-10-CM

## 2023-08-23 DIAGNOSIS — D64.9 ANEMIA, UNSPECIFIED TYPE: ICD-10-CM

## 2023-08-23 DIAGNOSIS — E55.9 VITAMIN D DEFICIENCY: ICD-10-CM

## 2023-08-23 DIAGNOSIS — I10 HYPERTENSION, UNSPECIFIED TYPE: ICD-10-CM

## 2023-08-23 DIAGNOSIS — D64.9 ANEMIA, UNSPECIFIED TYPE: Primary | ICD-10-CM

## 2023-08-23 LAB
25(OH)D3 SERPL-MCNC: 17.4 NG/ML (ref 30–100)
ALBUMIN SERPL-MCNC: 4.6 G/DL (ref 3.5–5.2)
ALBUMIN/GLOB SERPL: 1.7 G/DL
ALP SERPL-CCNC: 74 U/L (ref 39–117)
ALT SERPL W P-5'-P-CCNC: 15 U/L (ref 1–33)
ANION GAP SERPL CALCULATED.3IONS-SCNC: 12.4 MMOL/L (ref 5–15)
AST SERPL-CCNC: 18 U/L (ref 1–32)
BASOPHILS # BLD AUTO: 0.05 10*3/MM3 (ref 0–0.2)
BASOPHILS NFR BLD AUTO: 1 % (ref 0–1.5)
BILIRUB SERPL-MCNC: 0.6 MG/DL (ref 0–1.2)
BUN SERPL-MCNC: 9 MG/DL (ref 6–20)
BUN/CREAT SERPL: 14.1 (ref 7–25)
CALCIUM SPEC-SCNC: 9.4 MG/DL (ref 8.6–10.5)
CHLORIDE SERPL-SCNC: 101 MMOL/L (ref 98–107)
CHOLEST SERPL-MCNC: 227 MG/DL (ref 0–200)
CO2 SERPL-SCNC: 24.6 MMOL/L (ref 22–29)
CREAT SERPL-MCNC: 0.64 MG/DL (ref 0.57–1)
DEPRECATED RDW RBC AUTO: 40.8 FL (ref 37–54)
EGFRCR SERPLBLD CKD-EPI 2021: 105.8 ML/MIN/1.73
EOSINOPHIL # BLD AUTO: 0.11 10*3/MM3 (ref 0–0.4)
EOSINOPHIL NFR BLD AUTO: 2.2 % (ref 0.3–6.2)
ERYTHROCYTE [DISTWIDTH] IN BLOOD BY AUTOMATED COUNT: 13.2 % (ref 12.3–15.4)
FERRITIN SERPL-MCNC: 164 NG/ML (ref 13–150)
GLOBULIN UR ELPH-MCNC: 2.7 GM/DL
GLUCOSE SERPL-MCNC: 150 MG/DL (ref 65–99)
HBA1C MFR BLD: 6.8 % (ref 4.8–5.6)
HCT VFR BLD AUTO: 36.7 % (ref 34–46.6)
HDLC SERPL-MCNC: 61 MG/DL (ref 40–60)
HGB BLD-MCNC: 12.3 G/DL (ref 12–15.9)
IMM GRANULOCYTES # BLD AUTO: 0.01 10*3/MM3 (ref 0–0.05)
IMM GRANULOCYTES NFR BLD AUTO: 0.2 % (ref 0–0.5)
LDLC SERPL CALC-MCNC: 146 MG/DL (ref 0–100)
LDLC/HDLC SERPL: 2.36 {RATIO}
LYMPHOCYTES # BLD AUTO: 1.61 10*3/MM3 (ref 0.7–3.1)
LYMPHOCYTES NFR BLD AUTO: 32.7 % (ref 19.6–45.3)
MAGNESIUM SERPL-MCNC: 2.1 MG/DL (ref 1.6–2.6)
MCH RBC QN AUTO: 29 PG (ref 26.6–33)
MCHC RBC AUTO-ENTMCNC: 33.5 G/DL (ref 31.5–35.7)
MCV RBC AUTO: 86.6 FL (ref 79–97)
MONOCYTES # BLD AUTO: 0.28 10*3/MM3 (ref 0.1–0.9)
MONOCYTES NFR BLD AUTO: 5.7 % (ref 5–12)
NEUTROPHILS NFR BLD AUTO: 2.87 10*3/MM3 (ref 1.7–7)
NEUTROPHILS NFR BLD AUTO: 58.2 % (ref 42.7–76)
NRBC BLD AUTO-RTO: 0 /100 WBC (ref 0–0.2)
PLATELET # BLD AUTO: 201 10*3/MM3 (ref 140–450)
PMV BLD AUTO: 11.3 FL (ref 6–12)
POTASSIUM SERPL-SCNC: 4.3 MMOL/L (ref 3.5–5.2)
PROT SERPL-MCNC: 7.3 G/DL (ref 6–8.5)
RBC # BLD AUTO: 4.24 10*6/MM3 (ref 3.77–5.28)
SODIUM SERPL-SCNC: 138 MMOL/L (ref 136–145)
T4 FREE SERPL-MCNC: 0.9 NG/DL (ref 0.93–1.7)
TRIGL SERPL-MCNC: 111 MG/DL (ref 0–150)
TSH SERPL DL<=0.05 MIU/L-ACNC: 3.54 UIU/ML (ref 0.27–4.2)
VIT B12 BLD-MCNC: 525 PG/ML (ref 211–946)
VLDLC SERPL-MCNC: 20 MG/DL (ref 5–40)
WBC NRBC COR # BLD: 4.93 10*3/MM3 (ref 3.4–10.8)

## 2023-08-23 PROCEDURE — 85025 COMPLETE CBC W/AUTO DIFF WBC: CPT

## 2023-08-23 PROCEDURE — 80053 COMPREHEN METABOLIC PANEL: CPT

## 2023-08-23 PROCEDURE — 36415 COLL VENOUS BLD VENIPUNCTURE: CPT

## 2023-08-23 PROCEDURE — 82306 VITAMIN D 25 HYDROXY: CPT

## 2023-08-23 PROCEDURE — 80061 LIPID PANEL: CPT

## 2023-08-23 PROCEDURE — 83036 HEMOGLOBIN GLYCOSYLATED A1C: CPT

## 2023-08-23 PROCEDURE — 84439 ASSAY OF FREE THYROXINE: CPT

## 2023-08-23 PROCEDURE — 82728 ASSAY OF FERRITIN: CPT

## 2023-08-23 PROCEDURE — 84443 ASSAY THYROID STIM HORMONE: CPT

## 2023-08-23 PROCEDURE — 82607 VITAMIN B-12: CPT

## 2023-08-23 PROCEDURE — 83735 ASSAY OF MAGNESIUM: CPT
